# Patient Record
Sex: MALE | Race: WHITE | Employment: FULL TIME | URBAN - METROPOLITAN AREA
[De-identification: names, ages, dates, MRNs, and addresses within clinical notes are randomized per-mention and may not be internally consistent; named-entity substitution may affect disease eponyms.]

---

## 2019-01-21 ENCOUNTER — OFFICE VISIT (OUTPATIENT)
Dept: INTERNAL MEDICINE CLINIC | Age: 54
End: 2019-01-21
Payer: COMMERCIAL

## 2019-01-21 VITALS
DIASTOLIC BLOOD PRESSURE: 76 MMHG | OXYGEN SATURATION: 98 % | HEART RATE: 84 BPM | WEIGHT: 221 LBS | SYSTOLIC BLOOD PRESSURE: 130 MMHG | BODY MASS INDEX: 29.16 KG/M2

## 2019-01-21 DIAGNOSIS — Z01.818 PRE-OP EXAMINATION: ICD-10-CM

## 2019-01-21 DIAGNOSIS — Z23 NEED FOR INFLUENZA VACCINATION: ICD-10-CM

## 2019-01-21 DIAGNOSIS — E78.5 HYPERLIPIDEMIA, UNSPECIFIED HYPERLIPIDEMIA TYPE: ICD-10-CM

## 2019-01-21 DIAGNOSIS — M17.12 LOCALIZED OSTEOARTHRITIS OF LEFT KNEE: Primary | ICD-10-CM

## 2019-01-21 PROCEDURE — 90471 IMMUNIZATION ADMIN: CPT | Performed by: NURSE PRACTITIONER

## 2019-01-21 PROCEDURE — 99214 OFFICE O/P EST MOD 30 MIN: CPT | Performed by: NURSE PRACTITIONER

## 2019-01-21 PROCEDURE — 90686 IIV4 VACC NO PRSV 0.5 ML IM: CPT | Performed by: NURSE PRACTITIONER

## 2020-02-17 ENCOUNTER — OFFICE VISIT (OUTPATIENT)
Dept: INTERNAL MEDICINE CLINIC | Age: 55
End: 2020-02-17
Payer: COMMERCIAL

## 2020-02-17 ENCOUNTER — HOSPITAL ENCOUNTER (OUTPATIENT)
Age: 55
Discharge: HOME | End: 2020-02-17
Payer: COMMERCIAL

## 2020-02-17 VITALS
RESPIRATION RATE: 16 BRPM | HEART RATE: 84 BPM | BODY MASS INDEX: 30.75 KG/M2 | HEIGHT: 72 IN | OXYGEN SATURATION: 97 % | SYSTOLIC BLOOD PRESSURE: 136 MMHG | DIASTOLIC BLOOD PRESSURE: 88 MMHG | WEIGHT: 227 LBS

## 2020-02-17 LAB
CHOLESTEROL, TOTAL: 277 MG/DL (ref 0–199)
HDLC SERPL-MCNC: 48 MG/DL (ref 40–60)
LDL CHOLESTEROL CALCULATED: 198 MG/DL
TRIGL SERPL-MCNC: 153 MG/DL (ref 0–150)
TSH REFLEX: 2.25 UIU/ML (ref 0.27–4.2)
VLDLC SERPL CALC-MCNC: 31 MG/DL

## 2020-02-17 PROCEDURE — 99213 OFFICE O/P EST LOW 20 MIN: CPT | Performed by: NURSE PRACTITIONER

## 2020-02-17 PROCEDURE — 93000 ELECTROCARDIOGRAM COMPLETE: CPT | Performed by: NURSE PRACTITIONER

## 2020-02-17 PROCEDURE — 84443 ASSAY THYROID STIM HORMONE: CPT

## 2020-02-17 PROCEDURE — 80061 LIPID PANEL: CPT

## 2020-02-17 PROCEDURE — 36415 COLL VENOUS BLD VENIPUNCTURE: CPT

## 2020-02-17 ASSESSMENT — PATIENT HEALTH QUESTIONNAIRE - PHQ9
SUM OF ALL RESPONSES TO PHQ QUESTIONS 1-9: 0
1. LITTLE INTEREST OR PLEASURE IN DOING THINGS: 0
SUM OF ALL RESPONSES TO PHQ QUESTIONS 1-9: 0
2. FEELING DOWN, DEPRESSED OR HOPELESS: 0
SUM OF ALL RESPONSES TO PHQ9 QUESTIONS 1 & 2: 0

## 2020-02-17 ASSESSMENT — ENCOUNTER SYMPTOMS
ORTHOPNEA: 0
CHEST TIGHTNESS: 0
COUGH: 0
CONSTIPATION: 0
SORE THROAT: 0
SINUS PAIN: 0
SHORTNESS OF BREATH: 0
WHEEZING: 0
VOMITING: 0
NAUSEA: 0
DIARRHEA: 0
SPUTUM PRODUCTION: 0
ABDOMINAL PAIN: 0

## 2020-02-17 NOTE — PROGRESS NOTES
Robbie [address]  [address] [facility]  [address]  Tel:[phone]    Nieves Duran is a 49 y.o. male who presents today for hismedical conditions/complaints as noted below. Nieves Merritter is c/o of Chest Pain (Lft Side; Started a couple of weeks ago)    Chief Complaint   Patient presents with    Chest Pain     Lft Side; Started a couple of weeks ago     HPI:     Chest Pain    This is a new problem. The current episode started 1 to 4 weeks ago. The onset quality is gradual. The problem occurs daily. The problem has been unchanged. The pain is present in the substernal region. The pain is at a severity of 4/10. The pain is mild. The quality of the pain is described as sharp and squeezing. The pain does not radiate. Pertinent negatives include no abdominal pain, claudication, cough, diaphoresis, dizziness, exertional chest pressure, fever, irregular heartbeat, lower extremity edema, malaise/fatigue, nausea, near-syncope, orthopnea, palpitations, shortness of breath, sputum production, vomiting or weakness. Associated symptoms comments: Able to specifically point to area of pain near the left sternal border. The pain is aggravated by nothing. He has tried nothing for the symptoms. The treatment provided no relief. Risk factors include male gender. His family medical history is significant for hypertension, early MI and stroke. Subjective:     Review of Systems   Constitutional: Negative for diaphoresis, fever and malaise/fatigue. HENT: Negative for sinus pain and sore throat. Respiratory: Negative for cough, sputum production, chest tightness, shortness of breath and wheezing. Cardiovascular: Positive for chest pain. Negative for palpitations, orthopnea, claudication, leg swelling and near-syncope. Gastrointestinal: Negative for abdominal pain, constipation, diarrhea, nausea and vomiting.    Genitourinary: Negative for dysuria and urgency. Skin: Negative for rash. Neurological: Negative for dizziness and weakness. Objective:     Vitals:    02/17/20 1339   BP: 136/88   Site: Right Upper Arm   Position: Sitting   Cuff Size: Medium Adult   Pulse: 84   Resp: 16   SpO2: 97%   Weight: 227 lb (103 kg)   Height: 6' (1.829 m)     Physical Exam  Vitals signs and nursing note reviewed. Constitutional:       Appearance: Normal appearance. He is well-developed. HENT:      Head: Normocephalic and atraumatic. Right Ear: Hearing and external ear normal.      Left Ear: Hearing and external ear normal.      Nose: Nose normal.   Eyes:      General: Lids are normal.      Pupils: Pupils are equal, round, and reactive to light. Neck:      Musculoskeletal: Normal range of motion. Cardiovascular:      Rate and Rhythm: Normal rate and regular rhythm. No extrasystoles are present. Chest Wall: PMI is not displaced. Pulses: Normal pulses. Heart sounds: Normal heart sounds, S1 normal and S2 normal. Heart sounds not distant. No murmur. No systolic murmur. No diastolic murmur. No friction rub. No gallop. No S3 or S4 sounds. Pulmonary:      Effort: Pulmonary effort is normal. No accessory muscle usage or respiratory distress. Breath sounds: Normal breath sounds. No decreased breath sounds, wheezing, rhonchi or rales. Chest:      Chest wall: Tenderness present. No mass, lacerations or edema. There is no dullness to percussion. Breasts:         Right: Normal.         Left: Normal.       Abdominal:      General: Bowel sounds are normal.      Palpations: Abdomen is soft. Lymphadenopathy:      Upper Body:      Right upper body: No supraclavicular or axillary adenopathy. Left upper body: No supraclavicular or axillary adenopathy. Skin:     General: Skin is warm and dry. Neurological:      Mental Status: He is alert. Psychiatric:         Speech: Speech normal.       Assessment & Plan:    The following diagnoses and conditions are stable with no further orders unlessindicated:    1. Chest pain, unspecified type    2. Colon cancer screening      Cristian Marrufo was seen today for chest pain. Diagnoses and all orders for this visit:    Chest pain, unspecified type  -     EKG 12 Lead  -     LIPID PANEL; Future  -     TSH with Reflex; Future    Suspect Musculoskeletal pain as primary cause. Recommend NSAIDs (400-600 mg Ibuprofen TID PRN) for relief of inflammatory pain. Encouraged stretches before extensive physical activity. Monitor for changes in symptomatology including shortness of breath/exertional dyspnea, pain migrating toward the left shoulder/neck, nausea, palpitations. EKG normal in office. Consider CXR if symptoms persist.     Colon cancer screening  -     Valencia Raymond MD, Gastroenterology, [facility]    Return if symptoms worsen or fail to improve. Patient should call the office immediately with new or ongoing signs or symptoms or worsening, or proceed to the emergency room. If you are onmedications which could impair your senses, you are at risk of weakness, falls, dizziness, or drowsiness. You should be careful during activities which could place you at risk of harm, such as climbing, using stairs,operating machinery, or driving vehicles. If you feel you cannot safely do these activities, you should request others to help you, or avoid the activities altogether. If you are drowsy for any other reason, you should usethe same precautions as listed above. Call if pattern of symptoms change or persists for an extended time.     BARBARA Jay

## 2020-02-18 RX ORDER — ATORVASTATIN CALCIUM 20 MG/1
20 TABLET, FILM COATED ORAL DAILY
Qty: 30 TABLET | Refills: 2 | Status: SHIPPED | OUTPATIENT
Start: 2020-02-18 | End: 2020-03-19

## 2020-03-12 ENCOUNTER — OFFICE VISIT (OUTPATIENT)
Dept: INTERNAL MEDICINE CLINIC | Age: 55
End: 2020-03-12
Payer: COMMERCIAL

## 2020-03-12 VITALS
OXYGEN SATURATION: 98 % | HEART RATE: 73 BPM | BODY MASS INDEX: 30.38 KG/M2 | DIASTOLIC BLOOD PRESSURE: 80 MMHG | WEIGHT: 224 LBS | SYSTOLIC BLOOD PRESSURE: 130 MMHG

## 2020-03-12 PROCEDURE — 99214 OFFICE O/P EST MOD 30 MIN: CPT | Performed by: NURSE PRACTITIONER

## 2020-03-12 RX ORDER — CEFDINIR 300 MG/1
300 CAPSULE ORAL 2 TIMES DAILY
Qty: 14 CAPSULE | Refills: 0 | Status: SHIPPED | OUTPATIENT
Start: 2020-03-12 | End: 2020-03-19

## 2020-03-12 ASSESSMENT — ENCOUNTER SYMPTOMS
VOMITING: 0
DIARRHEA: 0
RHINORRHEA: 1
CHEST TIGHTNESS: 0
WHEEZING: 0
NAUSEA: 0
SHORTNESS OF BREATH: 0
SINUS PRESSURE: 1
VOICE CHANGE: 1
SORE THROAT: 0
COUGH: 1
SINUS PAIN: 0
CONSTIPATION: 0

## 2020-05-13 ENCOUNTER — HOSPITAL ENCOUNTER (OUTPATIENT)
Age: 55
Discharge: HOME | End: 2020-05-13
Payer: COMMERCIAL

## 2020-05-13 LAB
A/G RATIO: 1.8 (ref 1.1–2.2)
ALBUMIN SERPL-MCNC: 4.5 G/DL (ref 3.4–5)
ALP BLD-CCNC: 63 U/L (ref 40–129)
ALT SERPL-CCNC: 37 U/L (ref 10–40)
ANION GAP SERPL CALCULATED.3IONS-SCNC: 11 MMOL/L (ref 3–16)
AST SERPL-CCNC: 26 U/L (ref 15–37)
BILIRUB SERPL-MCNC: 0.5 MG/DL (ref 0–1)
BUN BLDV-MCNC: 24 MG/DL (ref 7–20)
CALCIUM SERPL-MCNC: 9.6 MG/DL (ref 8.3–10.6)
CHLORIDE BLD-SCNC: 103 MMOL/L (ref 99–110)
CHOLESTEROL, TOTAL: 136 MG/DL (ref 0–199)
CO2: 27 MMOL/L (ref 21–32)
CREAT SERPL-MCNC: 0.6 MG/DL (ref 0.9–1.3)
GFR AFRICAN AMERICAN: >60
GFR NON-AFRICAN AMERICAN: >60
GLOBULIN: 2.5 G/DL
GLUCOSE BLD-MCNC: 111 MG/DL (ref 70–99)
HDLC SERPL-MCNC: 39 MG/DL (ref 40–60)
HIV AG/AB: NORMAL
HIV ANTIGEN: NORMAL
HIV-1 ANTIBODY: NORMAL
HIV-2 AB: NORMAL
LDL CHOLESTEROL CALCULATED: 84 MG/DL
POTASSIUM SERPL-SCNC: 4.4 MMOL/L (ref 3.5–5.1)
PROSTATE SPECIFIC ANTIGEN: 0.98 NG/ML (ref 0–4)
SODIUM BLD-SCNC: 141 MMOL/L (ref 136–145)
TOTAL PROTEIN: 7 G/DL (ref 6.4–8.2)
TRIGL SERPL-MCNC: 66 MG/DL (ref 0–150)
VLDLC SERPL CALC-MCNC: 13 MG/DL

## 2020-05-13 PROCEDURE — 86701 HIV-1ANTIBODY: CPT

## 2020-05-13 PROCEDURE — 80053 COMPREHEN METABOLIC PANEL: CPT

## 2020-05-13 PROCEDURE — 80061 LIPID PANEL: CPT

## 2020-05-13 PROCEDURE — 36415 COLL VENOUS BLD VENIPUNCTURE: CPT

## 2020-05-13 PROCEDURE — 87390 HIV-1 AG IA: CPT

## 2020-05-13 PROCEDURE — 86702 HIV-2 ANTIBODY: CPT

## 2020-05-13 PROCEDURE — 84153 ASSAY OF PSA TOTAL: CPT

## 2020-05-14 ENCOUNTER — VIRTUAL VISIT (OUTPATIENT)
Dept: INTERNAL MEDICINE CLINIC | Age: 55
End: 2020-05-14
Payer: COMMERCIAL

## 2020-05-14 PROCEDURE — 99212 OFFICE O/P EST SF 10 MIN: CPT | Performed by: NURSE PRACTITIONER

## 2020-05-14 ASSESSMENT — ENCOUNTER SYMPTOMS
CONSTIPATION: 0
VOMITING: 0
SHORTNESS OF BREATH: 0
SINUS PAIN: 0
DIARRHEA: 0
NAUSEA: 0
COUGH: 0
SORE THROAT: 0
CHEST TIGHTNESS: 0

## 2020-05-14 ASSESSMENT — PATIENT HEALTH QUESTIONNAIRE - PHQ9
2. FEELING DOWN, DEPRESSED OR HOPELESS: 0
SUM OF ALL RESPONSES TO PHQ QUESTIONS 1-9: 0
SUM OF ALL RESPONSES TO PHQ9 QUESTIONS 1 & 2: 0
SUM OF ALL RESPONSES TO PHQ QUESTIONS 1-9: 0
1. LITTLE INTEREST OR PLEASURE IN DOING THINGS: 0

## 2020-05-14 NOTE — PROGRESS NOTES
2020    TELEHEALTH EVALUATION -- Audio/Visual (During [id number] public health emergency)    HPI:    Jb Chand (:  1965) has requested an audio/video evaluation for the following concern(s):    Mr. Ashley Kat presents for his HLD check. Overall he states he is doing well. Denies side effects of statin. Hyperlipidemia   This is a chronic problem. The current episode started more than 1 year ago. The problem is controlled. Recent lipid tests were reviewed and are normal. He has no history of chronic renal disease, diabetes, hypothyroidism or obesity. Factors aggravating his hyperlipidemia include fatty foods. Pertinent negatives include no chest pain, focal sensory loss, leg pain, myalgias or shortness of breath. Current antihyperlipidemic treatment includes statins, diet change and exercise. The current treatment provides significant improvement of lipids. There are no compliance problems. Risk factors for coronary artery disease include family history, male sex and dyslipidemia. The 10-year ASCVD risk score (Jamarcus Lang, et al., 2013) is: 3.7%    Values used to calculate the score:      Age: 49 years      Sex: Male      Is Non- : No      Diabetic: No      Tobacco smoker: No      Systolic Blood Pressure: 638 mmHg      Is BP treated: No      HDL Cholesterol: 39 mg/dL      Total Cholesterol: 136 mg/dL        Review of Systems   Constitutional: Negative for fever. HENT: Negative for sinus pain and sore throat. Respiratory: Negative for cough, chest tightness and shortness of breath. Cardiovascular: Negative for chest pain and palpitations. Gastrointestinal: Negative for constipation, diarrhea, nausea and vomiting. Genitourinary: Negative for dysuria and urgency. Musculoskeletal: Negative for myalgias. Skin: Negative for rash. Neurological: Negative for dizziness and weakness. Prior to Visit Medications    Medication Sig Taking?  Authorizing Provider

## 2020-09-04 ENCOUNTER — TELEPHONE (OUTPATIENT)
Dept: GASTROENTEROLOGY | Age: 55
End: 2020-09-04

## 2020-09-04 RX ORDER — POLYETHYLENE GLYCOL 3350 17 G/17G
POWDER, FOR SOLUTION ORAL
Qty: 238 G | Refills: 0 | Status: ON HOLD | OUTPATIENT
Start: 2020-09-04 | End: 2020-12-03 | Stop reason: HOSPADM

## 2020-09-04 RX ORDER — BISACODYL 5 MG
TABLET, DELAYED RELEASE (ENTERIC COATED) ORAL
Qty: 4 TABLET | Refills: 0 | Status: ON HOLD | OUTPATIENT
Start: 2020-09-04 | End: 2020-12-03 | Stop reason: HOSPADM

## 2020-09-04 NOTE — TELEPHONE ENCOUNTER
Trying to schedule colonoscopy    No hist. Diarrhea/ constipation  No blood thinners/ seizure meds    Prefers Monday or   Friday Oct 9th.

## 2020-09-04 NOTE — LETTER
- If you have questions after beginning the prep, call between 8:30 am & 4:30pm you will speak to a nurse or medical assistant, after 4:30pm you will reach the physician on call. - Hydration (body fluid) is the most important aspect of an effective and safe prep. If you are not drinking enough fluid you may experience cramping, nausea and dizziness. - Common side effects of the prep are nausea, bloating and shaking chills. If any of these occur, take a break from the prep (30 minutes to 1 hour) and then restart. If unable to complete after that notify the Physician as your procedure may need to be cancelled. Nausea medication or an alternative prep is sometimes called in.  - Do not leave home after starting the prep. Frequent, liquid stools may begin as soon as 15 minutes after the first bottle, although it could take up to 4 hours or longer for your first bowel movement. - Even if your stools are clear and watery in appearance, TAKE ALL OF THE PREP.  - Using baby wipes and nonprescription ointments, such as Desitin, will help with the irritation caused by frequent loose stools. - Due to unforeseen schedule changes, you may be asked to move your appointment time to an earlier/later time slot. To insure that your prep gives you the best results for your Colonoscopy, Please follow all directions closely. 3-5 days prior to your procedure:  1. Begin a low-fiber diet (no corn, dried beans, tomatoes, nuts, seeds, lettuce). 2. No bran or bulking agents. 3. Stop taking your multivitamin or iron supplements.   4. If you currently take Aggrenox, Dipyridamole, Persantine, Fondaparinux sodium (Arixtra), Dalteparin sodium Billee Maria Luz), Warfarin (Coumadin), Clopidogrel (Plavix), Prasugrel (Effient), Enoxaparin, Lovenox, or Heparin, Cilostazol (Pletal), Rivoroxaban (Xarelto), Desirudin (Iprivask), Cyclopentyltrazolopyrimide (Ticagrelor or Brilinta), Cangrelor (Cisneros Freshwater), Dabigatran (Pradaxa), Apixaban (Eliquis), Edoxaban (Savaysa)you should have received instructions regarding if and when to discontinue the medication. If you have not, or do not clearly understand the instructions, please call the office for clarification (number listed above). 5. Drink plenty of fluid. 1 day prior to your procedure:  1. Do not eat any SOLID FOOD, beginning with breakfast drink clear liquids only, which includes: Chicken or Beef Broth, Coffee/tea (without milk or creamer) Gatorade/PowerAde (no red or purple), JeIl-O (no red or purple), All Soda (even dark cola), Sorbet/Popsicles (no red or purple), Water If you take Diabetic medications (insulin/oral medication)-reduce the amount by one half on the morning of prep. You may resume the meds once you begin eating again. You must drink 8oz of liquids every hour to avoid dehydration. 2. If you take Diabetic medications (insulin/oral medication) - reduce the amount by one half on morning of prep. You may resume the meds once you begin eating again. 3. Bowel Cleansing Prep  ** 3:00pm Take 4 dulcolax (bisacodyl) tablets with a full glass of water  ** 5:00pm Mix one bottle of Miralax (polyethlene glycol) (238/255gm) in one bottle of Gatorade (64oz). Shake until dissolved. Drink 8 oz every 15 minutes until you have finished half of the solution. MORNING OF PROCEDURE  1. __3:00am__ (5 hours prior to the procedure) Drink the remaining portion of the solution 8 oz. every 15 minutes until completely finished, followed by 8 oz of any of the approved liquids. 2. Take your Blood pressure, Heart and Seizure medication the morning of the procedure with sips of water. 3. Bring inhalers with you. 4. Do not take your Diabetic medication the morning of procedure. 5. You must have a  stay with you during the entire procedure.         Dear Patient,      [facility]ighbor will receive a call from the [facility] pre-registration department prior DIAGNOSIS:  Z12.11-Colon cancer screening    Anesthesia: __IV sedation__  Time Needed: 30 minutes  Pt Position:  lateral, right side up         Outpatient _X_                                    _X__PREP:  Miralax                           _____Cardiac Clearance by; ___________           Medications to be stopped 5 days before procedure: _________  Additional / Special Orders:                                                                                                   Insurance: Herbert Pereira 150              ID #     [id number]            Ph #     (secondary ?)                                  _x___E-mailed 9/4/20     _x__ Spoke to Pt / Spouse                                                                                Franklyn Man    1965                                                    Endoscopy Order   IN ACCORDANCE WITH OUR FORMULARY SYSTEM, A GENERIC EQUIVALENT DRUG MAY BE DISPNSED AND ADMINISTERED UNLESS D. A. W. IS WRITTEN WITH THE MEDICATION ORDER.   DATE HOUR PHYSICIAN:  RECORD DATE, HOUR AND SIGN EACH ENTRY   10/9/20 8:00am 1)  Admit for:   [x]Colonoscopy  []EGD     []Anesthesia/MAC        []ERCP     []Upper EUS     []Lower EUS                             2)  Diagnosis: Z12.11     3)  Establish IV access     Solution:  []0.9 Normal Saline   [x]Lactated Ringers    []Other:                            Rate:   [x]KVO      []Other:     4)  Check blood sugar on all diabetic patients       Urine Pregnancy test on all menstruating females     5)  Labs:       []PT/INR         []Platelet       []Other:____________     6)  Procedure Medications:     [x]Fentanyl ______micrograms IV   []Demerol ________mg IV     [x]Versed _______mg IV                          []Other:     7) [x]Dinemap      [x]Pulse Oximetry    [x]Cardiac Monitor     8)  Post Procedure:       [x]Titrate O2 to keep O2 Sat.  > 90%     [x]Discharge instructions per                                                                     Endoscopy Protocol

## 2020-09-04 NOTE — TELEPHONE ENCOUNTER
Scheduled patient 10/9/20 with Dr. Blair Nichols. Mailed out prep instructions as requested. Patient aware to get tested for Covid on 10/3/20.      Please sign med order

## 2020-10-12 ENCOUNTER — TELEPHONE (OUTPATIENT)
Dept: GASTROENTEROLOGY | Age: 55
End: 2020-10-12

## 2020-10-12 NOTE — TELEPHONE ENCOUNTER
Moved appt day from [email or web address]@[employer] to 12/4/20@8am due to issues with getting covid testing done.  Endo faxed

## 2020-11-27 ENCOUNTER — OFFICE VISIT (OUTPATIENT)
Dept: PRIMARY CARE CLINIC | Age: 55
End: 2020-11-27
Payer: COMMERCIAL

## 2020-11-27 PROCEDURE — 99211 OFF/OP EST MAY X REQ PHY/QHP: CPT | Performed by: NURSE PRACTITIONER

## 2020-11-27 NOTE — PROGRESS NOTES
Zoe Caba received a viral test for COVID-19. They were educated on isolation and quarantine as appropriate. For any symptoms, they were directed to seek care from their PCP, given contact information to establish with a doctor, directed to an urgent care or the emergency room.

## 2020-11-27 NOTE — PATIENT INSTRUCTIONS

## 2020-11-28 LAB — SARS-COV-2: NOT DETECTED

## 2020-12-03 ENCOUNTER — HOSPITAL ENCOUNTER (OUTPATIENT)
Age: 55
Setting detail: OUTPATIENT SURGERY
Discharge: HOME | End: 2020-12-03
Attending: INTERNAL MEDICINE | Admitting: INTERNAL MEDICINE
Payer: COMMERCIAL

## 2020-12-03 VITALS
OXYGEN SATURATION: 100 % | RESPIRATION RATE: 14 BRPM | SYSTOLIC BLOOD PRESSURE: 129 MMHG | HEART RATE: 66 BPM | DIASTOLIC BLOOD PRESSURE: 77 MMHG | HEIGHT: 72 IN | WEIGHT: 226 LBS | BODY MASS INDEX: 30.61 KG/M2

## 2020-12-03 PROBLEM — Z12.11 SCREEN FOR COLON CANCER: Status: ACTIVE | Noted: 2020-12-03

## 2020-12-03 PROBLEM — K63.5 CECAL POLYP: Status: ACTIVE | Noted: 2020-12-03

## 2020-12-03 PROCEDURE — 88305 TISSUE EXAM BY PATHOLOGIST: CPT

## 2020-12-03 PROCEDURE — 45380 COLONOSCOPY AND BIOPSY: CPT | Performed by: INTERNAL MEDICINE

## 2020-12-03 PROCEDURE — 99152 MOD SED SAME PHYS/QHP 5/>YRS: CPT | Performed by: INTERNAL MEDICINE

## 2020-12-03 PROCEDURE — 7100000011 HC PHASE II RECOVERY - ADDTL 15 MIN: Performed by: INTERNAL MEDICINE

## 2020-12-03 PROCEDURE — 2709999900 HC NON-CHARGEABLE SUPPLY: Performed by: INTERNAL MEDICINE

## 2020-12-03 PROCEDURE — 6360000002 HC RX W HCPCS: Performed by: INTERNAL MEDICINE

## 2020-12-03 PROCEDURE — 2580000003 HC RX 258: Performed by: INTERNAL MEDICINE

## 2020-12-03 PROCEDURE — 3609010600 HC COLONOSCOPY POLYPECTOMY SNARE/COLD BIOPSY: Performed by: INTERNAL MEDICINE

## 2020-12-03 PROCEDURE — 99153 MOD SED SAME PHYS/QHP EA: CPT | Performed by: INTERNAL MEDICINE

## 2020-12-03 PROCEDURE — 7100000010 HC PHASE II RECOVERY - FIRST 15 MIN: Performed by: INTERNAL MEDICINE

## 2020-12-03 RX ORDER — MIDAZOLAM HYDROCHLORIDE 5 MG/ML
INJECTION INTRAMUSCULAR; INTRAVENOUS PRN
Status: DISCONTINUED | OUTPATIENT
Start: 2020-12-03 | End: 2020-12-03 | Stop reason: ALTCHOICE

## 2020-12-03 RX ORDER — DIPHENHYDRAMINE HYDROCHLORIDE 50 MG/ML
INJECTION INTRAMUSCULAR; INTRAVENOUS PRN
Status: DISCONTINUED | OUTPATIENT
Start: 2020-12-03 | End: 2020-12-03 | Stop reason: ALTCHOICE

## 2020-12-03 RX ORDER — SODIUM CHLORIDE, SODIUM LACTATE, POTASSIUM CHLORIDE, CALCIUM CHLORIDE 600; 310; 30; 20 MG/100ML; MG/100ML; MG/100ML; MG/100ML
INJECTION, SOLUTION INTRAVENOUS CONTINUOUS PRN
Status: COMPLETED | OUTPATIENT
Start: 2020-12-03 | End: 2020-12-03

## 2020-12-03 RX ORDER — FENTANYL CITRATE 50 UG/ML
INJECTION, SOLUTION INTRAMUSCULAR; INTRAVENOUS PRN
Status: DISCONTINUED | OUTPATIENT
Start: 2020-12-03 | End: 2020-12-03 | Stop reason: ALTCHOICE

## 2020-12-03 RX ORDER — SODIUM CHLORIDE, SODIUM LACTATE, POTASSIUM CHLORIDE, CALCIUM CHLORIDE 600; 310; 30; 20 MG/100ML; MG/100ML; MG/100ML; MG/100ML
INJECTION, SOLUTION INTRAVENOUS CONTINUOUS
Status: DISCONTINUED | OUTPATIENT
Start: 2020-12-03 | End: 2020-12-03 | Stop reason: HOSPADM

## 2020-12-03 RX ADMIN — SODIUM CHLORIDE, POTASSIUM CHLORIDE, SODIUM LACTATE AND CALCIUM CHLORIDE: 600; 310; 30; 20 INJECTION, SOLUTION INTRAVENOUS at 07:29

## 2020-12-03 ASSESSMENT — PAIN SCALES - GENERAL
PAINLEVEL_OUTOF10: 0

## 2020-12-03 ASSESSMENT — PAIN - FUNCTIONAL ASSESSMENT: PAIN_FUNCTIONAL_ASSESSMENT: 0-10

## 2020-12-03 NOTE — OP NOTE
[address] ,  [address] [facility]  Phone: [phone]   FQI:[phone]    Colonoscopy Procedure Note    Patient: Km Steven  : 1965    Procedure: Colonoscopy with --screening    Date:  12/3/2020     Endoscopist:  Juliocesar Lewis MD    Referring Physician:  Zabrina Ornelas MD    Preoperative Diagnosis:  colon screening    Postoperative Diagnosis:  See impression    Anesthesia: Anesthesia: Moderate Sedation  Sedation: Versed 7 mg IV, fentanyl 100 mcg IV, IV Benadryl 50 mg  Start Time: 7:40  Stop Time: 8:03  Withdrawal time: 8 minutes  ASA Class: 2  Mallampati: II (soft palate, uvula, fauces visible)    Indications: This is a 48y.o. year old male who presents today with screening for colon cancer. Procedure Details  Informed consent was obtained for the procedure, including sedation. Risks of perforation, hemorrhage, adverse drug reaction and aspiration were discussed. The patient was placed in the left lateral decubitus position. Based on the pre-procedure assessment, including review of the patient's medical history, medications, allergies, and review of systems, he had been deemed to be an appropriate candidate for conscious sedation; he was therefore sedated with the medications listed below. The patient was monitored continuously with ECG tracing, pulse oximetry, blood pressure monitoring, and direct observations. rectal examination was performed. The colonoscope was inserted into the rectum and advanced under direct vision to the cecum, which was identified by the ileocecal valve and appendiceal orifice. The quality of the colonic preparation was good. A careful inspection was made as the colonoscope was withdrawn, including a retroflexed view of the rectum; findings and interventions are described below. Appropriate photodocumentation was obtained. Patient tolerated the procedure well. Findings: -Mild sigmoid diverticulosis. One 4 mm sessile cecal polyp removed with cold forceps. Otherwise normal colonoscopy to the cecum.    - Anesthesia issues: no    Specimens: Was Obtained:  bottle A, sessile polyp, cecum, cold forceps    Complications:   None    Estimated blood loss: minimal    Disposition:   PACU - hemodynamically stable. Impression:   -Mild sigmoid diverticulosis. One 4 mm sessile cecal polyp removed with cold forceps. Otherwise normal colonoscopy to the cecum. Recommendations:  -Await pathology.  Likely repeat colonoscopy based on pathology.  -High fiber diet recommended for diverticulosis          Bronson Trivedi 12/3/20 8:07 AM EST

## 2020-12-03 NOTE — H&P
[address] ,  [address] [facility]  Phone: [phone]    CHIEF COMPLAINT     Here for screening colonoscopy. KRYSTA Foster is a 48 y.o. male who presents for screening colonoscopy. Denies GI symptoms. Denies family history of colon cancer in first degree family members. PAST MEDICAL HISTORY     Past Medical History:   Diagnosis Date    Acne     DR. Diana Kang    Basal cell adenocarcinoma     Skin    Family history of early CAD     Hyperlipidemia     [facility]    Treatment surgery and chemotherapy Dr. Chetan Henriquez [facility] 1975    DX at 6years old    Varicose veins      FAMILY HISTORY     Family History   Problem Relation Age of Onset    Cancer Mother         Breast, Thyroid, Skin, Uterine CA    Heart Disease Father 67        MI    Cancer Father         Skin    High Blood Pressure Father     Other Brother         Kidney Stones     SOCIAL HISTORY     Social History     Socioeconomic History    Marital status:      Spouse name: Not on file    Number of children: Not on file    Years of education: Not on file    Highest education level: Not on file   Occupational History    Not on file   Social Needs    Financial resource strain: Not on file    Food insecurity     Worry: Not on file     Inability: Not on file    Transportation needs     Medical: Not on file     Non-medical: Not on file   Tobacco Use    Smoking status: Never Smoker    Smokeless tobacco: Never Used   Substance and Sexual Activity    Alcohol use: Yes     Comment: Rare    Drug use: No    Sexual activity: Yes     Partners: Female   Lifestyle    Physical activity     Days per week: Not on file     Minutes per session: Not on file    Stress: Not on file   Relationships    Social connections     Talks on phone: Not on file     Gets together: Not on file     Attends [employer] service: Not on file     Active member of club or organization: Not on file     Attends meetings of clubs or organizations: Not on file     Relationship status: Not on file    Intimate partner violence     Fear of current or ex partner: Not on file     Emotionally abused: Not on file     Physically abused: Not on file     Forced sexual activity: Not on file   Other Topics Concern    Not on file   Social History Narrative    Not on file     SURGICAL HISTORY     Past Surgical History:   Procedure Laterality Date    APPENDECTOMY      [facility]  2009    Polymyositis    SKIN CANCER EXCISION  2012    Basal cell-left temporal area    SKIN CANCER EXCISION  2009    Nodular malignant melanoma: Surgery, chemotherapy Dr. Calos Gunn     No current facility-administered medications on file prior to encounter. Current Outpatient Medications on File Prior to Encounter   Medication Sig Dispense Refill    polyethylene glycol (MIRALAX) 17 GM/SCOOP powder Use as directed for colonoscopy prep 238 g 0    bisacodyl (DULCOLAX) 5 MG EC tablet Use as directed for colonoscopy prep 4 tablet 0    atorvastatin (LIPITOR) 20 MG tablet TAKE 1 TABLET BY MOUTH EVERY DAY 90 tablet 2     ALLERGIES   No Known Allergies  IMMUNIZATIONS     Immunization History   Administered Date(s) Administered    Influenza, Quadv, IM, PF (6 mo and older Fluzone, Flulaval, Fluarix, and 3 yrs and older Afluria) 01/21/2019    Td, unspecified formulation 11/14/2007     REVIEW OF SYSTEMS     Constitutional: Negative for appetite change, chills, fatigue, fever and unexpected weight change. HENT: Negative for ear pain, hearing loss and nosebleeds. Eyes: Negative for pain and visual disturbance. Respiratory: Negative for cough, shortness of breath and wheezing. Cardiovascular: Negative for chest pain, palpitations and leg swelling. Gastrointestinal: see HPI for details.   Endocrine: Negative for polydipsia, polyphagia and polyuria. Genitourinary: Negative for difficulty urinating, dysuria, hematuria and urgency. Musculoskeletal: Positive for arthralgias and back pain. Skin: Negative for pallor and rash. Allergic/Immunologic: Negative for environmental allergies and immunocompromised state. Neurological: Negative for seizures, syncope and numbness. Hematological: Negative for adenopathy. Does not bruise/bleed easily. Psychiatric/Behavioral: Negative for agitation, confusion, hallucinations and suicidal ideas. PHYSICAL EXAM   BP (!) 147/86   Pulse 81   Resp 14   Ht 6' (1.829 m)   Wt 226 lb (102.5 kg)   SpO2 99%   BMI 30.65 kg/m²   Wt Readings from Last 3 Encounters:   12/03/20 226 lb (102.5 kg)   03/12/20 224 lb (101.6 kg)   02/17/20 227 lb (103 kg)     Constitutional: AAO3, No acute distress  HEENT: no pallor or icterus. Cardiovascular: Normal heart rate, Normal rhythm, No murmurs,. RS: Normal breath sounds, No wheezing,   Abdomen: soft, non tender, not distended, Bs+. Extremities:  No edema.       FINAL IMPRESSION   Proceed with colonoscopy for screening   Sedation plan: moderate

## 2021-01-02 PROBLEM — Z12.11 SCREEN FOR COLON CANCER: Status: RESOLVED | Noted: 2020-12-03 | Resolved: 2021-01-02

## 2021-02-15 ENCOUNTER — OFFICE VISIT (OUTPATIENT)
Dept: INTERNAL MEDICINE CLINIC | Age: 56
End: 2021-02-15
Payer: COMMERCIAL

## 2021-02-15 ENCOUNTER — HOSPITAL ENCOUNTER (OUTPATIENT)
Age: 56
Discharge: HOME | End: 2021-02-15
Payer: COMMERCIAL

## 2021-02-15 VITALS
BODY MASS INDEX: 31.37 KG/M2 | HEART RATE: 78 BPM | HEIGHT: 72 IN | TEMPERATURE: 97.1 F | DIASTOLIC BLOOD PRESSURE: 92 MMHG | WEIGHT: 231.6 LBS | SYSTOLIC BLOOD PRESSURE: 136 MMHG | OXYGEN SATURATION: 98 %

## 2021-02-15 DIAGNOSIS — M33.20 POLYMYOSITIS (HCC): ICD-10-CM

## 2021-02-15 DIAGNOSIS — C43.59 MALIGNANT MELANOMA OF TORSO EXCLUDING BREAST (HCC): ICD-10-CM

## 2021-02-15 DIAGNOSIS — G89.29 CHRONIC LEFT SHOULDER PAIN: Primary | ICD-10-CM

## 2021-02-15 DIAGNOSIS — E78.41 ELEVATED LIPOPROTEIN(A): ICD-10-CM

## 2021-02-15 DIAGNOSIS — M25.512 CHRONIC LEFT SHOULDER PAIN: Primary | ICD-10-CM

## 2021-02-15 DIAGNOSIS — Z12.5 SPECIAL SCREENING FOR MALIGNANT NEOPLASM OF PROSTATE: ICD-10-CM

## 2021-02-15 PROBLEM — Z80.8 FAMILY HISTORY OF THYROID CANCER: Status: ACTIVE | Noted: 2020-02-13

## 2021-02-15 PROBLEM — M17.12 PRIMARY OSTEOARTHRITIS OF LEFT KNEE: Status: ACTIVE | Noted: 2019-02-04

## 2021-02-15 PROBLEM — Z80.8 FAMILY HISTORY OF BASAL CELL CARCINOMA: Status: ACTIVE | Noted: 2020-02-13

## 2021-02-15 LAB
A/G RATIO: 1.5 (ref 1.1–2.2)
ALBUMIN SERPL-MCNC: 4.4 G/DL (ref 3.4–5)
ALP BLD-CCNC: 67 U/L (ref 40–129)
ALT SERPL-CCNC: 33 U/L (ref 10–40)
ANION GAP SERPL CALCULATED.3IONS-SCNC: 12 MMOL/L (ref 3–16)
AST SERPL-CCNC: 25 U/L (ref 15–37)
BILIRUB SERPL-MCNC: 0.5 MG/DL (ref 0–1)
BUN BLDV-MCNC: 14 MG/DL (ref 7–20)
CALCIUM SERPL-MCNC: 9.5 MG/DL (ref 8.3–10.6)
CHLORIDE BLD-SCNC: 103 MMOL/L (ref 99–110)
CHOLESTEROL, TOTAL: 163 MG/DL (ref 0–199)
CO2: 26 MMOL/L (ref 21–32)
CREAT SERPL-MCNC: 0.6 MG/DL (ref 0.9–1.3)
GFR AFRICAN AMERICAN: >60
GFR NON-AFRICAN AMERICAN: >60
GLOBULIN: 2.9 G/DL
GLUCOSE BLD-MCNC: 100 MG/DL (ref 70–99)
HDLC SERPL-MCNC: 45 MG/DL (ref 40–60)
LDL CHOLESTEROL CALCULATED: 96 MG/DL
POTASSIUM SERPL-SCNC: 4.2 MMOL/L (ref 3.5–5.1)
SODIUM BLD-SCNC: 141 MMOL/L (ref 136–145)
TOTAL PROTEIN: 7.3 G/DL (ref 6.4–8.2)
TRIGL SERPL-MCNC: 109 MG/DL (ref 0–150)
VLDLC SERPL CALC-MCNC: 22 MG/DL

## 2021-02-15 PROCEDURE — 99214 OFFICE O/P EST MOD 30 MIN: CPT | Performed by: INTERNAL MEDICINE

## 2021-02-15 PROCEDURE — 80053 COMPREHEN METABOLIC PANEL: CPT

## 2021-02-15 PROCEDURE — 36415 COLL VENOUS BLD VENIPUNCTURE: CPT

## 2021-02-15 PROCEDURE — 80061 LIPID PANEL: CPT

## 2021-02-15 RX ORDER — FLUOROURACIL 50 MG/G
CREAM TOPICAL
COMMUNITY
Start: 2020-11-24 | End: 2021-02-15

## 2021-02-15 RX ORDER — ATORVASTATIN CALCIUM 20 MG/1
TABLET, FILM COATED ORAL
Qty: 90 TABLET | Refills: 1 | Status: SHIPPED | OUTPATIENT
Start: 2021-02-15 | End: 2021-11-08

## 2021-02-15 ASSESSMENT — PATIENT HEALTH QUESTIONNAIRE - PHQ9
SUM OF ALL RESPONSES TO PHQ QUESTIONS 1-9: 0
1. LITTLE INTEREST OR PLEASURE IN DOING THINGS: 0
2. FEELING DOWN, DEPRESSED OR HOPELESS: 0

## 2021-02-16 ENCOUNTER — TELEPHONE (OUTPATIENT)
Dept: INTERNAL MEDICINE CLINIC | Age: 56
End: 2021-02-16

## 2021-02-16 NOTE — PROGRESS NOTES
Edson Stark (:  1965) is a 48 y.o. male,Established patient, here for evaluation of the following chief complaint(s):  Hyperlipidemia      ASSESSMENT/PLAN:  1. Chronic left shoulder pain  -Referral to orthopedics for evaluation and treatment:     Damion Anand MD, Orthopedic Surgery, [facility]    2. Elevated lipoprotein(a)  -Obtain fasting laboratory studies now as well as before next office visit     lipid Panel; Future  -     Comprehensive Metabolic Panel; Future  -     Lipid Panel; Future  -     Comprehensive Metabolic Panel; Future  -Refill:   Atorvastatin (LIPITOR) 20 MG tablet; TAKE 1 TABLET BY MOUTH EVERY DAY. For high cholesterol, Disp-90 tablet, R-1Normal    3. Malignant melanoma of torso excluding breast ([facility] [address]        Has been treated as noted above. 4. Polymyositis ([facility] [address]              Asymptomatic at this time. 5. Special screening for malignant neoplasm of prostate  -    Health maintenance. PSA screening; Future      Return in about 6 months (around 8/15/2021) for lPIDS. SUBJECTIVE/OBJECTIVE:  HPI  Patient with hyperlipidemia, polymyositis, BCC, positive family history for coronary artery disease. Review last office visit with me: 2016. BPV. Referred to ENT. Review laboratory data 2020: Chemistry panel: BUN: 24.  Creatinine: 0.6. Glucose 111. Lipid panel: Total cholesterol: 136. LDL cholesterol: 84. HIV testing was negative. PSA: 0.98.  12/3/2020. Colonoscopy: Cecal polyp: Pathology: Tubular adenoma. Dr. Osbaldo James. Mild sigmoid diverticulosis. Redo 5 years. Dermatology Dr. Sarah Christina. Positive malignant melanoma. Treated by Dr. Jeyson Francois of oncology with interferon in  until about . Seen by ENT Dr. Blanche Lane 1/15/2018 basal cell excision. Left TKR 2019 Dr. Wenceslao Murphy. Patient complains of pain for least a month left upper chest with radiation into his left shoulder blade. He has had left shoulder problem for a few years. Patient is a  always carried his load on his left shoulder. He relates unable to abduct his left shoulder very far. His left upper chest discomfort is not exertional.  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 He denies shortness of breath. His discomfort is positional in nature. Review of Systems    Review of Systems   Constitutional: negative   HENT: negative   EYES: negative   Respiratory: negative   Gastrointestinal: negative   Endocrine: negative   Musculoskeletal: Left upper chest, left shoulder and left scapular discomfort as noted above. Skin: History of melanoma and basal cell  Allergic/Immunological: negative   Hematological: negative   Psychiatric/Behavorial: negative   CV: negative   CNS: negative   :Negative   S/E:Negative  Renal: Negative      Physical Exam    Head/neck: Ears: Normal TM. No obstruction. Throat: No exudates, no erythema, no tonsillar enlargement. Neck: No lymphadenopathy. Eyes: EOMI, PERRLA with no nystagmus  Lungs: Clear to auscultation. CV: S1-S2 normal.    murmur. Carotid: No bruit. Abdominal Examination: Bowel sounds present. Soft nontender. No mass no guarding or   rebound. Spine/extremities: Decreased range of motion of left shoulder with abduction at 45 degrees only. Spine/extremities: No edema. No tenderness to palpation. Skin: No rash  CNS: Patient is alert, cooperative, moves all 4 limbs, ambulates without difficulty, light touch normal.   Good orientation. Blood pressure (!) 136/92, pulse 78, temperature 97.1 °F (36.2 °C), temperature source Oral, height 6' (1.829 m), weight 231 lb 9.6 oz (105.1 kg), SpO2 98 %. An electronic signature was used to authenticate this note.     --Yajaira Elias MD

## 2021-02-17 NOTE — TELEPHONE ENCOUNTER
Please call patient. Labs from 2/15/2021: Chemistry panel: All normal except glucose at 100 just borderline elevated. Treat: Avoid simple sugars in diet such as soda, cookies, etc.  Lipid panel: Total cholesterol: 163. Great      LDL cholesterol: 96: Good.   Dr. Herve Garzon

## 2021-08-15 NOTE — PROGRESS NOTES
Kristi Herr 48 y.o. male presents today for an Established patient for    Chief Complaint   Patient presents with    Hyperlipidemia           ASSESSMENT/PLAN    1. Elevated lipoprotein(a)               Blood pressure looks good. We have to monitor at home. Obtain laboratory studies now and call me for results as well as before next office visit  - Lipid Panel; Future  - Comprehensive Metabolic Panel; Future    2. Polymyositis (HCC)                    Baseline. 3. Malignant melanoma of torso excluding breast ([address]                   Basal cell removed by ENT from scalp. 4. Chronic left shoulder pain                 Referral to orthopedics       Return in about 6 months (around 2/16/2022) for LIPIDS. HPI:     Next office visit with me: 2/15/2021. Complaint of chronic left shoulder pain. Referred to orthopedics Dr. Ligia Rodríguez but I see no consult note. Review laboratory data 2/15/2021: Chemistry panel: 100. Lipid panel: Total cholesterol: 163. LDL cholesterol: 96. Colonoscopy 12/3/2020: Polyp: Tubular adenoma. Dr. Heena Rowe. Health maintenance: Hepatitis C, Covid vaccine, Tdap, shingles. 2/18/2021 Dr. Rocio Bach ENT basal cell carcinoma removed from the scalp  Patient failed to obtain laboratory studies before this office visit  While on diet weight reduction, 18 pounds, since last office visit.       Results for orders placed or performed during the hospital encounter of 02/15/21   Lipid Panel   Result Value Ref Range    Cholesterol, Total 163 0 - 199 mg/dL    Triglycerides 109 0 - 150 mg/dL    HDL 45 40 - 60 mg/dL    LDL Calculated 96 <100 mg/dL    VLDL Cholesterol Calculated 22 Not Established mg/dL   Comprehensive Metabolic Panel   Result Value Ref Range    Sodium 141 136 - 145 mmol/L    Potassium 4.2 3.5 - 5.1 mmol/L    Chloride 103 99 - 110 mmol/L    CO2 26 21 - 32 mmol/L    Anion Gap 12 3 - 16    Glucose 100 (H) 70 - 99 mg/dL    BUN 14 7 - 20 mg/dL    CREATININE 0.6 (L) 0.9 - 1.3 mg/dL    GFR Non- >60 >60    GFR African American >60 >60    Calcium 9.5 8.3 - 10.6 mg/dL    Total Protein 7.3 6.4 - 8.2 g/dL    Albumin 4.4 3.4 - 5.0 g/dL    Albumin/Globulin Ratio 1.5 1.1 - 2.2    Total Bilirubin 0.5 0.0 - 1.0 mg/dL    Alkaline Phosphatase 67 40 - 129 U/L    ALT 33 10 - 40 U/L    AST 25 15 - 37 U/L    Globulin 2.9 g/dL              ROS:  Review of Systems   Constitutional: negative   HENT: negative   EYES: negative   Respiratory: negative   Gastrointestinal: negative   Endocrine: 18 pound weight reduction on diet  Musculoskeletal: Left shoulder pain referred to orthopedics  Skin: Basal cell removed from scalp by ENT  Allergic/Immunological: negative   Hematological: negative   Psychiatric/Behavorial: negative   CV: negative   CNS: negative   :Negative   S/E:Negative  Renal: Negative     Physical Exam: BP: 132/76 by me  Head/neck: Ears: Normal TM. No obstruction. Throat: Mask. Not examined. Thyroid not palpable. Neck: No lymphadenopathy. Eyes: EOMI, PERRLA with no nystagmus  Lungs: Clear to auscultation. CV: S1-S2 normal.   ALBERTO murmur. Carotid: No bruit. Abdominal Examination: Bowel sounds present. Soft nontender. No mass no guarding or   rebound. Spine/extremities: No edema. No tenderness to palpation. Skin: No rash  CNS: Patient is alert, cooperative, moves all 4 limbs, ambulates without difficulty, light touch normal.  Good historian. Good orientation. Blood pressure 132/76, pulse 67, temperature 96.8 °F (36 °C), temperature source Infrared, height 6' (1.829 m), weight 213 lb 9.6 oz (96.9 kg), SpO2 98 %.          Dyan Acuna MD

## 2021-08-16 ENCOUNTER — OFFICE VISIT (OUTPATIENT)
Dept: INTERNAL MEDICINE CLINIC | Age: 56
End: 2021-08-16
Payer: COMMERCIAL

## 2021-08-16 ENCOUNTER — HOSPITAL ENCOUNTER (OUTPATIENT)
Age: 56
Discharge: HOME | End: 2021-08-16
Payer: COMMERCIAL

## 2021-08-16 VITALS
WEIGHT: 213.6 LBS | BODY MASS INDEX: 28.93 KG/M2 | TEMPERATURE: 96.8 F | OXYGEN SATURATION: 98 % | HEART RATE: 67 BPM | DIASTOLIC BLOOD PRESSURE: 76 MMHG | SYSTOLIC BLOOD PRESSURE: 132 MMHG | HEIGHT: 72 IN

## 2021-08-16 DIAGNOSIS — E78.41 ELEVATED LIPOPROTEIN(A): Primary | ICD-10-CM

## 2021-08-16 DIAGNOSIS — C43.59 MALIGNANT MELANOMA OF TORSO EXCLUDING BREAST (HCC): ICD-10-CM

## 2021-08-16 DIAGNOSIS — M25.512 CHRONIC LEFT SHOULDER PAIN: ICD-10-CM

## 2021-08-16 DIAGNOSIS — E78.41 ELEVATED LIPOPROTEIN(A): ICD-10-CM

## 2021-08-16 DIAGNOSIS — G89.29 CHRONIC LEFT SHOULDER PAIN: ICD-10-CM

## 2021-08-16 DIAGNOSIS — M33.20 POLYMYOSITIS (HCC): ICD-10-CM

## 2021-08-16 LAB
A/G RATIO: 1.6 (ref 1.1–2.2)
ALBUMIN SERPL-MCNC: 4.7 G/DL (ref 3.4–5)
ALP BLD-CCNC: 68 U/L (ref 40–129)
ALT SERPL-CCNC: 33 U/L (ref 10–40)
ANION GAP SERPL CALCULATED.3IONS-SCNC: 12 MMOL/L (ref 3–16)
AST SERPL-CCNC: 31 U/L (ref 15–37)
BILIRUB SERPL-MCNC: 0.7 MG/DL (ref 0–1)
BUN BLDV-MCNC: 17 MG/DL (ref 7–20)
CALCIUM SERPL-MCNC: 9.5 MG/DL (ref 8.3–10.6)
CHLORIDE BLD-SCNC: 100 MMOL/L (ref 99–110)
CHOLESTEROL, TOTAL: 158 MG/DL (ref 0–199)
CO2: 28 MMOL/L (ref 21–32)
CREAT SERPL-MCNC: 0.6 MG/DL (ref 0.9–1.3)
GFR AFRICAN AMERICAN: >60
GFR NON-AFRICAN AMERICAN: >60
GLOBULIN: 2.9 G/DL
GLUCOSE BLD-MCNC: 99 MG/DL (ref 70–99)
HDLC SERPL-MCNC: 47 MG/DL (ref 40–60)
LDL CHOLESTEROL CALCULATED: 96 MG/DL
POTASSIUM SERPL-SCNC: 4.2 MMOL/L (ref 3.5–5.1)
PROSTATE SPECIFIC ANTIGEN: 1.01 NG/ML (ref 0–4)
SODIUM BLD-SCNC: 140 MMOL/L (ref 136–145)
TOTAL PROTEIN: 7.6 G/DL (ref 6.4–8.2)
TRIGL SERPL-MCNC: 76 MG/DL (ref 0–150)
VLDLC SERPL CALC-MCNC: 15 MG/DL

## 2021-08-16 PROCEDURE — 84153 ASSAY OF PSA TOTAL: CPT

## 2021-08-16 PROCEDURE — 36415 COLL VENOUS BLD VENIPUNCTURE: CPT

## 2021-08-16 PROCEDURE — 99214 OFFICE O/P EST MOD 30 MIN: CPT | Performed by: INTERNAL MEDICINE

## 2021-08-16 PROCEDURE — 80061 LIPID PANEL: CPT

## 2021-08-16 PROCEDURE — 80053 COMPREHEN METABOLIC PANEL: CPT

## 2021-08-16 SDOH — ECONOMIC STABILITY: FOOD INSECURITY: WITHIN THE PAST 12 MONTHS, YOU WORRIED THAT YOUR FOOD WOULD RUN OUT BEFORE YOU GOT MONEY TO BUY MORE.: NEVER TRUE

## 2021-08-16 SDOH — ECONOMIC STABILITY: FOOD INSECURITY: WITHIN THE PAST 12 MONTHS, THE FOOD YOU BOUGHT JUST DIDN'T LAST AND YOU DIDN'T HAVE MONEY TO GET MORE.: NEVER TRUE

## 2021-08-16 ASSESSMENT — SOCIAL DETERMINANTS OF HEALTH (SDOH): HOW HARD IS IT FOR YOU TO PAY FOR THE VERY BASICS LIKE FOOD, HOUSING, MEDICAL CARE, AND HEATING?: NOT HARD AT ALL

## 2021-11-08 DIAGNOSIS — E78.41 ELEVATED LIPOPROTEIN(A): ICD-10-CM

## 2021-11-08 RX ORDER — ATORVASTATIN CALCIUM 20 MG/1
TABLET, FILM COATED ORAL
Qty: 90 TABLET | Refills: 1 | Status: SHIPPED | OUTPATIENT
Start: 2021-11-08 | End: 2022-06-27

## 2022-03-03 ENCOUNTER — HOSPITAL ENCOUNTER (OUTPATIENT)
Age: 57
Discharge: HOME | End: 2022-03-03
Payer: COMMERCIAL

## 2022-03-03 ENCOUNTER — OFFICE VISIT (OUTPATIENT)
Dept: INTERNAL MEDICINE CLINIC | Age: 57
End: 2022-03-03
Payer: COMMERCIAL

## 2022-03-03 VITALS
TEMPERATURE: 97.3 F | DIASTOLIC BLOOD PRESSURE: 84 MMHG | SYSTOLIC BLOOD PRESSURE: 134 MMHG | BODY MASS INDEX: 29.84 KG/M2 | OXYGEN SATURATION: 100 % | HEART RATE: 63 BPM | WEIGHT: 220 LBS

## 2022-03-03 DIAGNOSIS — E78.5 HYPERLIPIDEMIA, UNSPECIFIED HYPERLIPIDEMIA TYPE: Primary | ICD-10-CM

## 2022-03-03 DIAGNOSIS — Z23 IMMUNIZATION DUE: ICD-10-CM

## 2022-03-03 LAB
A/G RATIO: 2.3 (ref 1.1–2.2)
ALBUMIN SERPL-MCNC: 4.9 G/DL (ref 3.4–5)
ALP BLD-CCNC: 62 U/L (ref 40–129)
ALT SERPL-CCNC: 36 U/L (ref 10–40)
ANION GAP SERPL CALCULATED.3IONS-SCNC: 16 MMOL/L (ref 3–16)
AST SERPL-CCNC: 27 U/L (ref 15–37)
BILIRUB SERPL-MCNC: 0.7 MG/DL (ref 0–1)
BUN BLDV-MCNC: 20 MG/DL (ref 7–20)
CALCIUM SERPL-MCNC: 9.5 MG/DL (ref 8.3–10.6)
CHLORIDE BLD-SCNC: 102 MMOL/L (ref 99–110)
CHOLESTEROL, TOTAL: 144 MG/DL (ref 0–199)
CO2: 25 MMOL/L (ref 21–32)
CREAT SERPL-MCNC: 0.6 MG/DL (ref 0.9–1.3)
GFR AFRICAN AMERICAN: >60
GFR NON-AFRICAN AMERICAN: >60
GLUCOSE BLD-MCNC: 92 MG/DL (ref 70–99)
HDLC SERPL-MCNC: 48 MG/DL (ref 40–60)
LDL CHOLESTEROL CALCULATED: 84 MG/DL
POTASSIUM SERPL-SCNC: 4.5 MMOL/L (ref 3.5–5.1)
SODIUM BLD-SCNC: 143 MMOL/L (ref 136–145)
TOTAL PROTEIN: 7 G/DL (ref 6.4–8.2)
TRIGL SERPL-MCNC: 59 MG/DL (ref 0–150)
VLDLC SERPL CALC-MCNC: 12 MG/DL

## 2022-03-03 PROCEDURE — 36415 COLL VENOUS BLD VENIPUNCTURE: CPT

## 2022-03-03 PROCEDURE — 90471 IMMUNIZATION ADMIN: CPT | Performed by: NURSE PRACTITIONER

## 2022-03-03 PROCEDURE — 99213 OFFICE O/P EST LOW 20 MIN: CPT | Performed by: NURSE PRACTITIONER

## 2022-03-03 PROCEDURE — 80053 COMPREHEN METABOLIC PANEL: CPT

## 2022-03-03 PROCEDURE — 80061 LIPID PANEL: CPT

## 2022-03-03 PROCEDURE — 90715 TDAP VACCINE 7 YRS/> IM: CPT | Performed by: NURSE PRACTITIONER

## 2022-03-03 ASSESSMENT — ENCOUNTER SYMPTOMS
VOMITING: 0
CONSTIPATION: 0
DIARRHEA: 0
SHORTNESS OF BREATH: 0
BLOOD IN STOOL: 0
ABDOMINAL PAIN: 0
EYE DISCHARGE: 0
COUGH: 0
NAUSEA: 0
WHEEZING: 0

## 2022-03-03 ASSESSMENT — PATIENT HEALTH QUESTIONNAIRE - PHQ9
SUM OF ALL RESPONSES TO PHQ QUESTIONS 1-9: 0
SUM OF ALL RESPONSES TO PHQ9 QUESTIONS 1 & 2: 0
2. FEELING DOWN, DEPRESSED OR HOPELESS: 0
SUM OF ALL RESPONSES TO PHQ QUESTIONS 1-9: 0
1. LITTLE INTEREST OR PLEASURE IN DOING THINGS: 0
SUM OF ALL RESPONSES TO PHQ QUESTIONS 1-9: 0
SUM OF ALL RESPONSES TO PHQ QUESTIONS 1-9: 0

## 2022-03-03 NOTE — PROGRESS NOTES
03/03/22    Gerald Carrera  55 y.o.  male      Chief Complaint   Patient presents with    Hyperlipidemia         HPI:   Pt presents for evaluation of HLD, to review labs, and continue refills of meds. He is on my schedule being unable to get an appointment with Dr. Severo Goodness. Unfortunately he just stopped down at the lab this morning for blood draw. His only med is Lipitor 20 mg    Lab Results   Component Value Date    CHOL 158 08/16/2021    CHOL 163 02/15/2021    CHOL 136 05/13/2020     Lab Results   Component Value Date    TRIG 76 08/16/2021    TRIG 109 02/15/2021    TRIG 66 05/13/2020     Lab Results   Component Value Date    HDL 47 08/16/2021    HDL 45 02/15/2021    HDL 39 (L) 05/13/2020     Lab Results   Component Value Date    LDLCALC 96 08/16/2021    LDLCALC 96 02/15/2021    LDLCALC 84 05/13/2020     Lab Results   Component Value Date    LABVLDL 15 08/16/2021    LABVLDL 22 02/15/2021    LABVLDL 13 05/13/2020     No results found for: CHOLHDLRATIO     Lab Results   Component Value Date     08/16/2021    K 4.2 08/16/2021     08/16/2021    CO2 28 08/16/2021    BUN 17 08/16/2021    CREATININE 0.6 (L) 08/16/2021    GLUCOSE 99 08/16/2021    CALCIUM 9.5 08/16/2021    PROT 7.6 08/16/2021    LABALBU 4.7 08/16/2021    BILITOT 0.7 08/16/2021    ALKPHOS 68 08/16/2021    AST 31 08/16/2021    ALT 33 08/16/2021    LABGLOM >60 08/16/2021    GFRAA >60 08/16/2021    AGRATIO 1.6 08/16/2021    GLOB 2.9 08/16/2021       No results found for: LABA1C  No results found for: EAG       1. Hyperlipidemia, unspecified hyperlipidemia type  At this time he will continue current Lipitor 20 mg daily  He will watch for lab results in [email or web address] and send any question via [email or web address]  When I get results I weill contact patient with any needed change, titration or addition  Return to see Dr Grier in 6 months    2.  Immunization due    - Tdap (age 6y and older) IM (BOOSTRIX)      /84   Pulse 63   Temp 97.3 °F (36.3 °C)   Wt 220 lb Housing Stability:     Unable to Pay for Housing in the Last Year: Not on file    Number of Places Lived in the Last Year: Not on file    Unstable Housing in the Last Year: Not on file       Family History   Problem Relation Age of Onset    Cancer Mother         Breast, Thyroid, Skin, Uterine CA    Heart Disease Father 67        MI    Cancer Father         Skin    High Blood Pressure Father     Other Brother         Kidney Stones       Past Medical History:   Diagnosis Date    Acne     DR. Shruthi Baird    Basal cell adenocarcinoma     Skin    Family history of early CAD     Hyperlipidemia     Malignant melanoma ([facility] [place] [address] 2009    Treatment surgery and chemotherapy Dr. Romi Person Polymyositis ([facility] [place] [address] 1975    DX at 6years old    Varicose veins        Review of Systems   Constitutional: Negative for fever. HENT: Negative for congestion. Eyes: Negative for discharge. Respiratory: Negative for cough, shortness of breath and wheezing. Cardiovascular: Negative for chest pain, palpitations and leg swelling. Gastrointestinal: Negative for abdominal pain, blood in stool, constipation, diarrhea, nausea and vomiting. Genitourinary: Negative for dysuria and hematuria. Musculoskeletal: Negative for myalgias. Skin: Negative for rash. Neurological: Negative for dizziness. Psychiatric/Behavioral: The patient is not nervous/anxious. Physical Exam  Constitutional:       General: He is not in acute distress. Appearance: He is not diaphoretic. HENT:      Right Ear: External ear normal.      Left Ear: External ear normal.      Mouth/Throat:      Pharynx: No oropharyngeal exudate. Eyes:      General: No scleral icterus. Right eye: No discharge. Left eye: No discharge. Neck:      Thyroid: No thyromegaly. Cardiovascular:      Rate and Rhythm: Normal rate and regular rhythm. Heart sounds: Normal heart sounds. No murmur heard. No friction rub. No gallop.     Pulmonary:      Effort: Pulmonary effort is normal.      Breath sounds: Normal breath sounds. No wheezing. Abdominal:      General: Bowel sounds are normal. There is no distension. Palpations: Abdomen is soft. Tenderness: There is no abdominal tenderness. Musculoskeletal:         General: No tenderness. Normal range of motion. Cervical back: Normal range of motion. Lymphadenopathy:      Cervical: No cervical adenopathy. Skin:     General: Skin is warm and dry. Findings: No erythema or rash. Neurological:      Mental Status: He is alert and oriented to person, place, and time.    Psychiatric:         Judgment: Judgment normal.                MANUELITO Schulz - CNP

## 2022-06-26 DIAGNOSIS — E78.41 ELEVATED LIPOPROTEIN(A): ICD-10-CM

## 2022-06-27 RX ORDER — ATORVASTATIN CALCIUM 20 MG/1
TABLET, FILM COATED ORAL
Qty: 90 TABLET | Refills: 1 | Status: SHIPPED | OUTPATIENT
Start: 2022-06-27

## 2022-06-27 NOTE — TELEPHONE ENCOUNTER
Refill request for atorvastatin  medication.      Name of Pharmacy- [facility]       Last visit - 3-3-2022     Pending visit - 3-9-2023    Last refill -11-8-2021      Medication Contract signed -   Last Thomas stone-         Additional Comments
In my judgment no risk for PPH has been identified at this time.

## 2022-12-19 DIAGNOSIS — E78.41 ELEVATED LIPOPROTEIN(A): ICD-10-CM

## 2022-12-19 NOTE — TELEPHONE ENCOUNTER
Refill request for lipitor medication.      Name of Pharmacy- [facility]      Last visit - 8/24/22     Pending visit - 3/9/23    Last refill -6/27/22  Medication Contract signed -   Last Oarrs ran-         Additional Comments

## 2022-12-20 DIAGNOSIS — Z12.5 SPECIAL SCREENING FOR MALIGNANT NEOPLASM OF PROSTATE: ICD-10-CM

## 2022-12-20 DIAGNOSIS — E78.5 HYPERLIPIDEMIA, UNSPECIFIED HYPERLIPIDEMIA TYPE: Primary | ICD-10-CM

## 2022-12-20 RX ORDER — ATORVASTATIN CALCIUM 20 MG/1
TABLET, FILM COATED ORAL
Qty: 90 TABLET | Refills: 0 | Status: SHIPPED | OUTPATIENT
Start: 2022-12-20

## 2022-12-21 NOTE — TELEPHONE ENCOUNTER
I left a VM for the patient to call our office because he is past due for fasting laboratory studies and office visit with Dr. Otto Barrientos. Labs are ordered in [facility], make appointment for OV with Dr. Otto Barrientos for December/January.

## 2022-12-21 NOTE — TELEPHONE ENCOUNTER
Please call patient: Past due for fasting laboratory studies and office visit with me in September.   Order for fasting laboratory studies, ordered in epic, before office visit with me sometime December/January  Dr. Sha Espinoza

## 2022-12-22 NOTE — TELEPHONE ENCOUNTER
2nd call    Left a follow-up VM to call our office to schedule Dr. Shweta Majano for an office visit as he's past due.     [facility] message sent

## 2023-01-06 ENCOUNTER — APPOINTMENT (OUTPATIENT)
Dept: CT IMAGING | Age: 58
End: 2023-01-06
Payer: COMMERCIAL

## 2023-01-06 ENCOUNTER — HOSPITAL ENCOUNTER (EMERGENCY)
Age: 58
Discharge: HOME | End: 2023-01-06
Payer: COMMERCIAL

## 2023-01-06 ENCOUNTER — APPOINTMENT (OUTPATIENT)
Dept: ULTRASOUND IMAGING | Age: 58
End: 2023-01-06
Payer: COMMERCIAL

## 2023-01-06 VITALS
SYSTOLIC BLOOD PRESSURE: 145 MMHG | HEART RATE: 84 BPM | DIASTOLIC BLOOD PRESSURE: 82 MMHG | OXYGEN SATURATION: 94 % | TEMPERATURE: 98 F | WEIGHT: 220 LBS | BODY MASS INDEX: 29.8 KG/M2 | RESPIRATION RATE: 18 BRPM | HEIGHT: 72 IN

## 2023-01-06 DIAGNOSIS — R10.84 GENERALIZED ABDOMINAL PAIN: Primary | ICD-10-CM

## 2023-01-06 DIAGNOSIS — N20.1 LEFT URETERAL CALCULUS: ICD-10-CM

## 2023-01-06 DIAGNOSIS — R11.0 NAUSEA WITHOUT VOMITING: ICD-10-CM

## 2023-01-06 LAB
A/G RATIO: 2.1 (ref 1.1–2.2)
ALBUMIN SERPL-MCNC: 5.2 G/DL (ref 3.4–5)
ALP BLD-CCNC: 74 U/L (ref 40–129)
ALT SERPL-CCNC: 36 U/L (ref 10–40)
ANION GAP SERPL CALCULATED.3IONS-SCNC: 10 MMOL/L (ref 3–16)
AST SERPL-CCNC: 26 U/L (ref 15–37)
BASOPHILS ABSOLUTE: 0 K/UL (ref 0–0.2)
BASOPHILS RELATIVE PERCENT: 0.3 %
BILIRUB SERPL-MCNC: 1 MG/DL (ref 0–1)
BILIRUBIN URINE: NEGATIVE
BLOOD, URINE: NEGATIVE
BUN BLDV-MCNC: 22 MG/DL (ref 7–20)
CALCIUM SERPL-MCNC: 10.1 MG/DL (ref 8.3–10.6)
CHLORIDE BLD-SCNC: 98 MMOL/L (ref 99–110)
CLARITY: CLEAR
CO2: 28 MMOL/L (ref 21–32)
COLOR: YELLOW
CREAT SERPL-MCNC: 1 MG/DL (ref 0.9–1.3)
EOSINOPHILS ABSOLUTE: 0 K/UL (ref 0–0.6)
EOSINOPHILS RELATIVE PERCENT: 0.2 %
GFR SERPL CREATININE-BSD FRML MDRD: >60 ML/MIN/{1.73_M2}
GLUCOSE BLD-MCNC: 112 MG/DL (ref 70–99)
GLUCOSE URINE: NEGATIVE MG/DL
HCT VFR BLD CALC: 45.2 % (ref 40.5–52.5)
HEMOGLOBIN: 15.6 G/DL (ref 13.5–17.5)
KETONES, URINE: NEGATIVE MG/DL
LEUKOCYTE ESTERASE, URINE: NEGATIVE
LIPASE: 17 U/L (ref 13–60)
LYMPHOCYTES ABSOLUTE: 0.8 K/UL (ref 1–5.1)
LYMPHOCYTES RELATIVE PERCENT: 5.5 %
MCH RBC QN AUTO: 29.5 PG (ref 26–34)
MCHC RBC AUTO-ENTMCNC: 34.5 G/DL (ref 31–36)
MCV RBC AUTO: 85.6 FL (ref 80–100)
MICROSCOPIC EXAMINATION: NORMAL
MONOCYTES ABSOLUTE: 0.8 K/UL (ref 0–1.3)
MONOCYTES RELATIVE PERCENT: 6.2 %
NEUTROPHILS ABSOLUTE: 12 K/UL (ref 1.7–7.7)
NEUTROPHILS RELATIVE PERCENT: 87.8 %
NITRITE, URINE: NEGATIVE
PDW BLD-RTO: 13.2 % (ref 12.4–15.4)
PH UA: 7 (ref 5–8)
PLATELET # BLD: 316 K/UL (ref 135–450)
PMV BLD AUTO: 6.9 FL (ref 5–10.5)
POTASSIUM SERPL-SCNC: 4.3 MMOL/L (ref 3.5–5.1)
PROTEIN UA: NEGATIVE MG/DL
RBC # BLD: 5.27 M/UL (ref 4.2–5.9)
SODIUM BLD-SCNC: 136 MMOL/L (ref 136–145)
SPECIFIC GRAVITY UA: 1.02 (ref 1–1.03)
TOTAL PROTEIN: 7.7 G/DL (ref 6.4–8.2)
URINE REFLEX TO CULTURE: NORMAL
URINE TYPE: NORMAL
UROBILINOGEN, URINE: 0.2 E.U./DL
WBC # BLD: 13.7 K/UL (ref 4–11)

## 2023-01-06 PROCEDURE — 6360000002 HC RX W HCPCS: Performed by: NURSE PRACTITIONER

## 2023-01-06 PROCEDURE — 83690 ASSAY OF LIPASE: CPT

## 2023-01-06 PROCEDURE — 85025 COMPLETE CBC W/AUTO DIFF WBC: CPT

## 2023-01-06 PROCEDURE — 74177 CT ABD & PELVIS W/CONTRAST: CPT

## 2023-01-06 PROCEDURE — 99285 EMERGENCY DEPT VISIT HI MDM: CPT

## 2023-01-06 PROCEDURE — 96374 THER/PROPH/DIAG INJ IV PUSH: CPT

## 2023-01-06 PROCEDURE — 81003 URINALYSIS AUTO W/O SCOPE: CPT

## 2023-01-06 PROCEDURE — 6360000004 HC RX CONTRAST MEDICATION: Performed by: NURSE PRACTITIONER

## 2023-01-06 PROCEDURE — 76705 ECHO EXAM OF ABDOMEN: CPT

## 2023-01-06 PROCEDURE — 80053 COMPREHEN METABOLIC PANEL: CPT

## 2023-01-06 RX ORDER — KETOROLAC TROMETHAMINE 30 MG/ML
30 INJECTION, SOLUTION INTRAMUSCULAR; INTRAVENOUS ONCE
Status: COMPLETED | OUTPATIENT
Start: 2023-01-06 | End: 2023-01-06

## 2023-01-06 RX ORDER — MORPHINE SULFATE 4 MG/ML
4 INJECTION, SOLUTION INTRAMUSCULAR; INTRAVENOUS EVERY 30 MIN PRN
Status: DISCONTINUED | OUTPATIENT
Start: 2023-01-06 | End: 2023-01-06 | Stop reason: HOSPADM

## 2023-01-06 RX ORDER — OXYCODONE HYDROCHLORIDE AND ACETAMINOPHEN 5; 325 MG/1; MG/1
1-2 TABLET ORAL EVERY 6 HOURS PRN
Qty: 20 TABLET | Refills: 0 | Status: SHIPPED | OUTPATIENT
Start: 2023-01-06 | End: 2023-01-06 | Stop reason: SDUPTHER

## 2023-01-06 RX ORDER — ONDANSETRON 4 MG/1
4 TABLET, FILM COATED ORAL EVERY 8 HOURS PRN
Qty: 20 TABLET | Refills: 0 | Status: SHIPPED | OUTPATIENT
Start: 2023-01-06

## 2023-01-06 RX ORDER — TAMSULOSIN HYDROCHLORIDE 0.4 MG/1
0.4 CAPSULE ORAL DAILY
Qty: 5 CAPSULE | Refills: 0 | Status: SHIPPED | OUTPATIENT
Start: 2023-01-06 | End: 2023-01-06 | Stop reason: SDUPTHER

## 2023-01-06 RX ORDER — KETOROLAC TROMETHAMINE 10 MG/1
10 TABLET, FILM COATED ORAL EVERY 6 HOURS PRN
Qty: 20 TABLET | Refills: 0 | Status: SHIPPED | OUTPATIENT
Start: 2023-01-06 | End: 2024-01-06

## 2023-01-06 RX ORDER — KETOROLAC TROMETHAMINE 10 MG/1
10 TABLET, FILM COATED ORAL EVERY 6 HOURS PRN
Qty: 20 TABLET | Refills: 0 | Status: SHIPPED | OUTPATIENT
Start: 2023-01-06 | End: 2023-01-06 | Stop reason: SDUPTHER

## 2023-01-06 RX ORDER — ONDANSETRON 2 MG/ML
4 INJECTION INTRAMUSCULAR; INTRAVENOUS ONCE
Status: DISCONTINUED | OUTPATIENT
Start: 2023-01-06 | End: 2023-01-06 | Stop reason: HOSPADM

## 2023-01-06 RX ORDER — TAMSULOSIN HYDROCHLORIDE 0.4 MG/1
0.4 CAPSULE ORAL DAILY
Qty: 5 CAPSULE | Refills: 0 | Status: SHIPPED | OUTPATIENT
Start: 2023-01-06 | End: 2023-01-11

## 2023-01-06 RX ORDER — ONDANSETRON 4 MG/1
4 TABLET, FILM COATED ORAL EVERY 8 HOURS PRN
Qty: 20 TABLET | Refills: 0 | Status: SHIPPED | OUTPATIENT
Start: 2023-01-06 | End: 2023-01-06 | Stop reason: SDUPTHER

## 2023-01-06 RX ORDER — OXYCODONE HYDROCHLORIDE AND ACETAMINOPHEN 5; 325 MG/1; MG/1
1-2 TABLET ORAL EVERY 6 HOURS PRN
Qty: 20 TABLET | Refills: 0 | Status: SHIPPED | OUTPATIENT
Start: 2023-01-06 | End: 2023-01-09

## 2023-01-06 RX ADMIN — KETOROLAC TROMETHAMINE 30 MG: 30 INJECTION, SOLUTION INTRAMUSCULAR; INTRAVENOUS at 16:30

## 2023-01-06 RX ADMIN — IOPAMIDOL 75 ML: 755 INJECTION, SOLUTION INTRAVENOUS at 15:21

## 2023-01-06 ASSESSMENT — ENCOUNTER SYMPTOMS
ABDOMINAL PAIN: 1
VOMITING: 0
WHEEZING: 0
SHORTNESS OF BREATH: 0
NAUSEA: 0
COLOR CHANGE: 0
DIARRHEA: 0
COUGH: 0
BACK PAIN: 0

## 2023-01-06 ASSESSMENT — PAIN DESCRIPTION - LOCATION
LOCATION: ABDOMEN
LOCATION: ABDOMEN

## 2023-01-06 ASSESSMENT — PAIN DESCRIPTION - DESCRIPTORS: DESCRIPTORS: ACHING

## 2023-01-06 ASSESSMENT — PAIN - FUNCTIONAL ASSESSMENT: PAIN_FUNCTIONAL_ASSESSMENT: 0-10

## 2023-01-06 ASSESSMENT — PAIN SCALES - GENERAL
PAINLEVEL_OUTOF10: 7
PAINLEVEL_OUTOF10: 8

## 2023-01-06 ASSESSMENT — PAIN DESCRIPTION - ORIENTATION: ORIENTATION: LEFT;UPPER

## 2023-01-06 NOTE — ED NOTES
Pt d/c. CAPOS reviewed and signed, all questions answered. NAD noted.      Mary Izquierdo RN  01/06/23 [id number]

## 2023-01-06 NOTE — ED PROVIDER NOTES
**ADVANCED PRACTICE PROVIDER, I HAVE EVALUATED THIS [facility]  ED  EMERGENCY DEPARTMENT ENCOUNTER      Pt Name: Lashell Reid  FTI:[id number]  [place] 1965  Date of evaluation: 1/6/2023  Provider: MANUELITO Lawrence CNP  Note Started: 1:52 PM EST 1/6/2023        Chief Complaint:    Chief Complaint   Patient presents with    Abdominal Pain     Left abd pain started on Wednesday, got better on Thursday came back today ,          Nursing Notes, Past Medical Hx, Past Surgical Hx, Social Hx, Allergies, and Family Hx were all reviewed and agreed with or any disagreements were addressed in the HPI.    HPI: (Location, Duration, Timing, Severity, Quality, Assoc Sx, Context, Modifying factors)    History From: patient  Limitations to history : None    Chief Complaint of left lateral mid abdominal pain    This is a  65 y.o. male who presents to the emergency department with left lower quadrant abdominal pain, no nausea vomiting or diarrhea, patient has been dealing with pain for the past 3 days, he denies any cough, congestion, fever or chills, no chest pain pleuritic chest pain or shortness of breath. Has not taken anything for the pain, his wife is at the bedside and is concerned patient has diverticulosis or diverticulitis however the patient has no history of this. Rates abdominal pain an 8 out of 10, describes as a gnawing deep pain, denies taking any medicine for symptoms. Patient denies any urinary symptoms, he denies any nausea vomiting or diarrhea, no urinary symptoms, no additional complaints, no additional aggravating relieving factors. Patient presents awake, alert and in no acute respiratory distress or toxic appearance    PastMedical/Surgical History:      Diagnosis Date    Acne     DR. Traore    Basal cell adenocarcinoma     Skin    Family history of early CAD     Hyperlipidemia     Malignant melanoma ([facility] [address] 2009    Treatment surgery and chemotherapy Dr. Gomez Abt Polymyositis ([facility] [address] 1975    DX at 6years old    Varicose veins          Procedure Laterality Date    APPENDECTOMY      COLONOSCOPY N/A 12/3/2020    COLONOSCOPY POLYPECTOMY SNARE/COLD BIOPSY performed by Navjot Hyman MD at [facility] BIOPSY  2009    Polymyositis    SKIN CANCER EXCISION  2012    Basal cell-left temporal area    SKIN CANCER EXCISION  2009    Nodular malignant melanoma: Surgery, chemotherapy Dr. Cecile Gustafson       Medications:  Discharge Medication List as of 1/6/2023  6:32 PM        CONTINUE these medications which have NOT CHANGED    Details   atorvastatin (LIPITOR) 20 MG tablet TAKE 1 TABLET BY MOUTH EVERY DAY. FOR HIGH CHOLESTEROL, Disp-90 tablet, R-0No Refill See Dr. Cayla Jaquez             Review of Systems:  (1 systems needed)  Review of Systems   Constitutional:  Negative for chills and fever. HENT:  Negative for congestion. Respiratory:  Negative for cough, shortness of breath and wheezing. Cardiovascular:  Negative for chest pain. Gastrointestinal:  Positive for abdominal pain. Negative for diarrhea, nausea and vomiting. Patient complains of left lower quadrant abdominal pain, no nausea vomiting or diarrhea, pain is more in the middle of the left lateral side of the abdomen   Genitourinary:  Negative for dysuria, frequency, hematuria and urgency. Musculoskeletal:  Negative for back pain. Skin:  Negative for color change. Neurological:  Negative for weakness, numbness and headaches. \"Positives and Pertinent negatives as per HPI\"    Physical Exam:  Physical Exam  Vitals and nursing note reviewed. Constitutional:       Appearance: He is well-developed. He is not diaphoretic. HENT:      Head: Normocephalic. Right Ear: External ear normal.      Left Ear: External ear normal.   Eyes:      General: No scleral icterus. Right eye: No discharge. Left eye: No discharge.    Cardiovascular: Rate and Rhythm: Normal rate and regular rhythm. Pulmonary:      Effort: Pulmonary effort is normal. No respiratory distress. Breath sounds: Normal breath sounds. Abdominal:      Palpations: Abdomen is soft. Tenderness: There is abdominal tenderness. Comments: Abdomen is generally soft and nondistended. Bowel sounds are hypoactive, he does have tenderness in the left upper quadrant in the middle aspect of the left side of the abdomen on exam however there is no rebound tenderness at Monroe's point, no ascites or rigidity. Musculoskeletal:         General: Normal range of motion. Cervical back: Normal range of motion and neck supple. Skin:     General: Skin is warm. Capillary Refill: Capillary refill takes less than 2 seconds. Coloration: Skin is not pale. Neurological:      Mental Status: He is alert and oriented to person, place, and time. GCS: GCS eye subscore is 4. GCS verbal subscore is 5. GCS motor subscore is 6. Cranial Nerves: Cranial nerves 2-12 are intact.       Comments: Patient presents awake, alert and in no acute distress or toxic appearance   Psychiatric:         Behavior: Behavior normal.       MEDICAL DECISION MAKING    Vitals:    Vitals:    01/06/23 1319 01/06/23 1320 01/06/23 1835   BP:  (!) 153/97 (!) 145/82   Pulse: 87  84   Resp: 18     Temp: 98 °F (36.7 °C)     TempSrc: Oral     SpO2: 99%  94%   Weight: 220 lb (99.8 kg)     Height: 6' (1.829 m)         LABS:  Labs Reviewed   CBC WITH AUTO DIFFERENTIAL - Abnormal; Notable for the following components:       Result Value    WBC 13.7 (*)     Neutrophils Absolute 12.0 (*)     Lymphocytes Absolute 0.8 (*)     All other components within normal limits   COMPREHENSIVE METABOLIC PANEL - Abnormal; Notable for the following components:    Chloride 98 (*)     Glucose 112 (*)     BUN 22 (*)     Albumin 5.2 (*)     All other components within normal limits   LIPASE   URINALYSIS WITH REFLEX TO CULTURE Remainder of labs reviewed and were negative at this time or not returned at the time of this note. RADIOLOGY:   Non-plain film images such as CT, Ultrasound and MRI are read by the radiologist. Malika CHAVIS APRN - CNP have directly visualized the radiologic plain film image(s) with the below findings:      Interpretation per the Radiologist below, if available at the time of this note:    [address]   Final Result   Unremarkable right upper quadrant ultrasound. No sonographic evidence of   cholelithiasis or acute cholecystitis seen. CT ABDOMEN PELVIS W IV CONTRAST Additional Contrast? None   Preliminary Result   1. Findings consistent with the presence of left-sided obstructive uropathy   which is felt to be related to a 4 mm calculus in the region of the left   ureterovesical junction as described above. 2. Prominence of mural thickness of the urinary bladder. Finding in part   likely related to decompressed state of the bladder. True mural thickening   cannot be excluded based on this examination. MEDICAL DECISION MAKING / ED COURSE:    Because of high probability of sudden clinical deterioration of the patient's condition and risk of further deterioration, critical care time required my full attention to the patient's condition; which included chart data review, documentation, medication ordering, reviewing the patient's old records, reevaluation patient's cardiac, pulmonary and neurological status. Reevaluation of vital signs. Consultations with ED attending and admitting physician. Ordering, interpreting reviewing diagnostic testing. Therefore, I personally saw the patient and independently provided 22 minutes of non-concurrent critical care out of the total shared critical care time provided, direct attention to the patient's condition did not include time spent on procedures.     PROCEDURES:   Procedures    None    Patient was given:  Medications   ondansetron [facility]) injection 4 mg (4 mg IntraVENous Not Given 1/6/23 1356)   morphine sulfate (PF) injection 4 mg (has no administration in time range)   iopamidol (ISOVUE-370) 76 % injection 75 mL (75 mLs IntraVENous Given 1/6/23 1521)   ketorolac (TORADOL) injection 30 mg (30 mg IntraVENous Given 1/6/23 1630)       CONSULTS: (Who and What was discussed)  None          Chronic Conditions affecting care:    has a past medical history of Acne, Basal cell adenocarcinoma, Family history of early CAD, Hyperlipidemia, Malignant melanoma ([facility] [address] (2009), Polymyositis ([facility] [address] (1975), and Varicose veins. Patient presents with left lower quadrant abdominal pain, no nausea vomiting or diarrhea, patient has been dealing with pain for the past 3 days, he denies any cough, congestion, fever or chills, no chest pain pleuritic chest pain or shortness of breath. Has not taken anything for the pain, his wife is at the bedside and is concerned patient has diverticulosis or diverticulitis however the patient has no history of this. Rates abdominal pain an 8 out of 10    After evaluation and examination the patient IV access, blood work, urinalysis, medications and CT scan abdomen ordered, patient was ordered morphine and Zofran IV. CBC shows a leukocytosis with a white count 13,700, no acute anemia. Urinalysis shows no infection. Metabolic panel shows a BUN of 22, chloride of 98, this will improve with IV fluids. Liver functions and lipase are normal.  CT the abdomen shows findings consistent with the presence of a left-sided obstructive uropathy, most likely related to 4 mm calculus in the region of the left UVJ. He does have some renal thickening of the urinary bladder however his urinalysis is unremarkable little concerns for cystitis. Gallbladder ultrasound is unremarkable, no evidence of cholelithiasis or acute cholecystitis.   Patient was given medications, I gave him Toradol which seemed to help more with the pain, I will send him home with the same medication. However, at this time no concerns for acute surgical abdomen, sepsis or other emergent etiology, I do not think it is necessary to do any additional diagnostics or imaging and patient can go home with outpatient follow-up. Therefore, shared medical decision was made between the patient's wife, patient and myself and we agreed the patient could be discharged home with outpatient follow-up. She was discharged home referral back to PCP and urology, call first thing in the morning for an appointment. Discharged home with Toradol, Zofran, Percocet and Flomax, educated take medicine as prescribed. Return to the ER for worsening or concerning symptoms. The patient tolerated their visit well. I evaluated the patient. The physician was available for consultation as needed. The patient and / or the family were informed of the results of any tests, a time was given to answer questions, a plan was proposed and they agreed with plan. Patient and family verbalized understanding of discharge instructions and the patient was discharged from the department in stable condition. I am the Primary Clinician of Record. CLINICAL IMPRESSION:  1. Generalized abdominal pain    2. Left ureteral calculus    3.  Nausea without vomiting        DISPOSITION Decision To Discharge 01/06/2023 06:07:28 PM      PATIENT REFERRED TO:  Rizwana Grier MD  [address] [place] [address] [address]    Schedule an appointment as soon as possible for a visit   As needed    [facility] [place] [address]  [phone]    This is a urology referral, follow-up with anyone in the group in the next 2 days for reevaluation    [facility]  ED  [facility] [address]  Go to   If symptoms worsen    DISCHARGE MEDICATIONS:  Discharge Medication List as of 1/6/2023  6:32 PM          DISCONTINUED MEDICATIONS:  Discharge Medication List as of 1/6/2023  6:32 PM                 (Please note the MDM and HPI sections of this note were completed with a voice recognition program.  Efforts were made to edit the dictations but occasionally words are mis-transcribed.)    Electronically signed, MANUELITO Coello CNP,           Steve Paramjit, APRN - CNP  01/06/23 Pradeep Boateng 92., APRN - CNP  01/06/23 [id number]

## 2023-01-18 ENCOUNTER — HOSPITAL ENCOUNTER (OUTPATIENT)
Dept: GENERAL RADIOLOGY | Age: 58
Discharge: HOME | End: 2023-01-18
Payer: COMMERCIAL

## 2023-01-18 DIAGNOSIS — N13.2 HYDRONEPHROSIS CONCURRENT WITH AND DUE TO CALCULI OF KIDNEY AND URETER: ICD-10-CM

## 2023-01-18 PROCEDURE — 74018 RADEX ABDOMEN 1 VIEW: CPT

## 2023-02-02 ENCOUNTER — HOSPITAL ENCOUNTER (OUTPATIENT)
Dept: ULTRASOUND IMAGING | Age: 58
Discharge: HOME | End: 2023-02-02
Payer: COMMERCIAL

## 2023-02-02 ENCOUNTER — HOSPITAL ENCOUNTER (OUTPATIENT)
Age: 58
Discharge: HOME | End: 2023-02-02
Payer: COMMERCIAL

## 2023-02-02 DIAGNOSIS — N13.2 HYDRONEPHROSIS WITH RENAL AND URETERAL CALCULUS OBSTRUCTION: ICD-10-CM

## 2023-02-02 PROCEDURE — 76770 US EXAM ABDO BACK WALL COMP: CPT

## 2023-02-23 ENCOUNTER — HOSPITAL ENCOUNTER (OUTPATIENT)
Age: 58
Discharge: HOME | End: 2023-02-23
Payer: COMMERCIAL

## 2023-02-23 DIAGNOSIS — Z12.5 SPECIAL SCREENING FOR MALIGNANT NEOPLASM OF PROSTATE: ICD-10-CM

## 2023-02-23 DIAGNOSIS — E78.5 HYPERLIPIDEMIA, UNSPECIFIED HYPERLIPIDEMIA TYPE: ICD-10-CM

## 2023-02-23 LAB
A/G RATIO: 2.1 (ref 1.1–2.2)
ALBUMIN SERPL-MCNC: 4.7 G/DL (ref 3.4–5)
ALP BLD-CCNC: 59 U/L (ref 40–129)
ALT SERPL-CCNC: 37 U/L (ref 10–40)
ANION GAP SERPL CALCULATED.3IONS-SCNC: 13 MMOL/L (ref 3–16)
AST SERPL-CCNC: 22 U/L (ref 15–37)
BILIRUB SERPL-MCNC: 0.6 MG/DL (ref 0–1)
BUN BLDV-MCNC: 19 MG/DL (ref 7–20)
CALCIUM SERPL-MCNC: 9.4 MG/DL (ref 8.3–10.6)
CHLORIDE BLD-SCNC: 102 MMOL/L (ref 99–110)
CHOLESTEROL, TOTAL: 118 MG/DL (ref 0–199)
CO2: 25 MMOL/L (ref 21–32)
CREAT SERPL-MCNC: 0.6 MG/DL (ref 0.9–1.3)
GFR SERPL CREATININE-BSD FRML MDRD: >60 ML/MIN/{1.73_M2}
GLUCOSE BLD-MCNC: 84 MG/DL (ref 70–99)
HDLC SERPL-MCNC: 46 MG/DL (ref 40–60)
LDL CHOLESTEROL CALCULATED: 60 MG/DL
POTASSIUM SERPL-SCNC: 4.9 MMOL/L (ref 3.5–5.1)
PROSTATE SPECIFIC ANTIGEN: 0.86 NG/ML (ref 0–4)
SODIUM BLD-SCNC: 140 MMOL/L (ref 136–145)
TOTAL PROTEIN: 6.9 G/DL (ref 6.4–8.2)
TRIGL SERPL-MCNC: 62 MG/DL (ref 0–150)
VLDLC SERPL CALC-MCNC: 12 MG/DL

## 2023-02-23 PROCEDURE — 84153 ASSAY OF PSA TOTAL: CPT

## 2023-02-23 PROCEDURE — 80061 LIPID PANEL: CPT

## 2023-02-23 PROCEDURE — 80053 COMPREHEN METABOLIC PANEL: CPT

## 2023-02-23 PROCEDURE — 36415 COLL VENOUS BLD VENIPUNCTURE: CPT

## 2023-03-08 NOTE — PROGRESS NOTES
History and Physical      Long Oneal  YOB: 1965    Date of Service:  3/9/2023    Chief Complaint:   Long Oneal is a 57 y.o. male who presents for complete physical examination.    HPI:            Patient here for annual exam.  Medicines and Allergies Reviewed:  Laboratory Data Reviewed: 2/23/2023: Chemistry panel unremarkable.  Lipid panel: Total cholesterol: 118.  LDL cholesterol 60.  PSA 0.86  Reviewed Health Maintenance: Shingles vaccine.  Influenza vaccine.  #1.  He notes difficulty with falling asleep in the evening after eating dinner.  He denies snoring and apneic spells but his bed partner cannot give history as she snores herself and takes out her hearing aids at night.  He is going to bed at 10 PM up at 5:30 AM awakens once to urinate and returns to sleep easily.  He walks 7 to 10 miles a day on his job.  2.  Reviewed: 1/6/2023 seen in emergency room [facility] complaint of left abdominal pain.  Work-up: RUQ ultrasound unremarkable.  CT scan abdomen/pelvis left-sided obstructive uropathy 4 mm calculus region of left UVJ patient states he apparently passed a stone and was not recovered.  Prominent family history for kidney stones in father as well as brother.    Wt Readings from Last 3 Encounters:   03/09/23 222 lb 9.6 oz (101 kg)   01/06/23 220 lb (99.8 kg)   03/03/22 220 lb (99.8 kg)     BP Readings from Last 3 Encounters:   03/09/23 136/82   01/06/23 (!) 145/82   03/03/22 134/84       Patient Active Problem List   Diagnosis    Hyperlipidemia    Family history of early CAD    Polymyositis (HCC)    Basal cell adenocarcinoma    Malignant melanoma (HCC)    Varicose veins    Acne    Cecal polyp    Family history of basal cell carcinoma    Family history of thyroid cancer    Neoplasm of uncertain behavior of skin    Personal history of malignant melanoma of skin    Primary osteoarthritis of left knee       Preventive Care:  Health Maintenance   Topic Date Due     Hepatitis C screen  Never done    COVID-19 Vaccine (3 - Booster for Moderna series) 07/06/2021    Flu vaccine (1) 08/01/2022    Shingles vaccine (2 of 2) 05/04/2023    Lipids  02/23/2024    Depression Screen  03/09/2024    Colorectal Cancer Screen  12/04/2025    DTaP/Tdap/Td vaccine (2 - Td or Tdap) 03/03/2032    HIV screen  Completed    Hepatitis A vaccine  Aged Out    Hib vaccine  Aged Out    Meningococcal (ACWY) vaccine  Aged Out    Pneumococcal 0-64 years Vaccine  Aged Out      Self-testicular exams: Yes  Sexual activity:  y  Last eye exam: , 3yr  Exercise: y  Seatbelt use: y  Lipid panel:    Lab Results   Component Value Date    CHOL 118 02/23/2023    TRIG 62 02/23/2023    HDL 46 02/23/2023    [address] 60 02/23/2023       Living will: n    Immunization History   Administered Date(s) Administered    COVID-19, MODERNA BLUE border, Primary or Immunocompromised, (age 12y+), IM, 100 mcg/0.5mL 04/13/2021, 05/11/2021    Influenza A (L2A0-03) Vaccine PF IM 11/05/2009    Influenza, FLUARIX, FLULAVAL, FLUZONE (age 10 mo+) AND AFLURIA, (age 1 y+), PF, 0.5mL 01/21/2019    Td (Adult), 5 Lf Tetanus Toxoid, Pf (Tenivac, Decavac) 11/14/2007    Td, unspecified formulation 11/14/2007    Tdap (Boostrix, Adacel) 03/03/2022    Zoster Recombinant (Shingrix) 03/09/2023       No Known Allergies  No outpatient medications have been marked as taking for the 3/9/23 encounter (Office Visit) with Bertha Mayen MD.       Past Medical History:   Diagnosis Date    Acne     DR. Traore    Basal cell adenocarcinoma     Skin    Family history of early CAD     Hyperlipidemia     Malignant melanoma ([facility] [address] 2009    Treatment surgery and chemotherapy Dr. Chris Stephens    Polymyositis [facility]) 1975    DX at 6years old    Varicose veins      Past Surgical History:   Procedure Laterality Date    APPENDECTOMY      COLONOSCOPY N/A 12/3/2020    COLONOSCOPY POLYPECTOMY SNARE/COLD BIOPSY performed by Marquis Rodrigues MD at [place] [address] HERNIA REPAIR      MUSCLE BIOPSY  2009    Polymyositis    SKIN CANCER EXCISION  2012    Basal cell-left temporal area    SKIN CANCER EXCISION  2009    Nodular malignant melanoma: Surgery, chemotherapy Dr. Sheyla Redi     Family History   Problem Relation Age of Onset    Cancer Mother         Breast, Thyroid, Skin, Uterine CA    Heart Disease Father 67        MI    Cancer Father         Skin    High Blood Pressure Father     Other Brother         Kidney Stones     Social History     Socioeconomic History    Marital status:      Spouse name: Not on file    Number of children: Not on file    Years of education: Not on file    Highest education level: Not on file   Occupational History    Not on file   Tobacco Use    Smoking status: Never    Smokeless tobacco: Never   Substance and Sexual Activity    Alcohol use: Yes     Comment: Rare    Drug use: No    Sexual activity: Yes     Partners: Female   Other Topics Concern    Not on file   Social History Narrative    Not on file     Social Determinants of Health     Financial Resource Strain: Low Risk     Difficulty of Paying Living Expenses: Not hard at all   Food Insecurity: No Food Insecurity    Worried About [email or web address] in the Last Year: Never true    [address] in the Last Year: Never true   Transportation Needs: Unknown    Lack of Transportation (Medical): Not on file    Lack of Transportation (Non-Medical):  No   Physical Activity: Not on file   Stress: Not on file   Social Connections: Not on file   Intimate Partner Violence: Not on file   Housing Stability: Unknown    Unable to Pay for Housing in the Last Year: Not on file    Number of Places Lived in the Last Year: Not on file    Unstable Housing in the Last Year: No       Review of Systems:  Constitutional: negative  HENT: negative  EYES: negative  Respiratory: negative  Cardiovasular :Negative  Gastrointestinal: negative  Endocrine: negative  Musculoskeletal: negative  Skin: negative  Allergic/Immunological: negative  Hematological: negative  Psychiatric/Behavorial: negative  : Kidney stone. Seen in emergency room 1/2023 has passed stone  Renal: negative  CNS: negative    Physical Exam:   Blood pressure 136/82, pulse 76, temperature 97.4 °F (36.3 °C), temperature source Temporal, height 6' 1.2\" (1.859 m), weight 222 lb 9.6 oz (101 kg), SpO2 97 %. Constitutional: He is oriented to person, place, and time. He appears well-developed and well-nourished. No distress. HEENT:   Head: Normocephalic and atraumatic. Right Ear: Tympanic membrane, external ear and ear canal normal.   Left Ear: Tympanic membrane, external ear and ear canal normal.   Nose: Nose normal.   Mouth/Throat: Posterior pharynx is somewhat narrow. Oropharynx is clear and moist and mucous membranes are normal. No oropharyngeal exudate or posterior oropharyngeal erythema. He has no cervical adenopathy. Eyes: Conjunctivae and extraocular motions are normal. Pupils are equal, round, and reactive to light. Neck: Full passive range of motion without pain. Neck supple. No JVD present. Carotid bruit is not present. No mass and no thyromegaly present. Cardiovascular: Normal rate, regular rhythm, normal heart sounds and intact distal pulses. Exam reveals no gallop and no friction rub. No murmur heard. Pulmonary/Chest: Effort normal and breath sounds normal. No respiratory distress. He has no wheezes, rhonchi or rales. Abdominal: Soft, non-tender. Bowel sounds and aorta are normal. There is no organomegaly, mass or bruit. Genitourinary: Penis without lesions. Testicle without masses. No hernia. Rectal Exam: No rectal masses. Scant amount of stool present brown guaiac negative  Prostate Exam: Prostate is medium to large in size. Medium in firmness. No definite mass. Not tender to touch. .  Musculoskeletal: Normal range of motion, no synovitis. He exhibits no edema.    Neurological: He is alert and oriented to person, place, and time. He has normal reflexes. No cranial nerve deficit. Coordination normal.   Skin: Skin is warm, dry and intact. No suspicious lesions are noted. Psychiatric: He has a normal mood and affect.  His speech is normal and behavior is normal. Judgment, cognition and memory are normal.    Results for orders placed or performed during the hospital encounter of 02/23/23   Lipid Panel   Result Value Ref Range    Cholesterol, Total 118 0 - 199 mg/dL    Triglycerides 62 0 - 150 mg/dL    HDL 46 40 - 60 mg/dL    LDL Calculated 60 <100 mg/dL    VLDL Cholesterol Calculated 12 Not Established mg/dL   Comprehensive Metabolic Panel   Result Value Ref Range    Sodium 140 136 - 145 mmol/L    Potassium 4.9 3.5 - 5.1 mmol/L    Chloride 102 99 - 110 mmol/L    CO2 25 21 - 32 mmol/L    Anion Gap 13 3 - 16    Glucose 84 70 - 99 mg/dL    BUN 19 7 - 20 mg/dL    Creatinine 0.6 (L) 0.9 - 1.3 mg/dL    Est, Glom Filt Rate >60 >60    Calcium 9.4 8.3 - 10.6 mg/dL    Total Protein 6.9 6.4 - 8.2 g/dL    Albumin 4.7 3.4 - 5.0 g/dL    Albumin/Globulin Ratio 2.1 1.1 - 2.2    Total Bilirubin 0.6 0.0 - 1.0 mg/dL    Alkaline Phosphatase 59 40 - 129 U/L    ALT 37 10 - 40 U/L    AST 22 15 - 37 U/L   PSA Screening   Result Value Ref Range    PSA 0.86 0.00 - 4.00 ng/mL     Assessment/Plan:  Patient Active Problem List   Diagnosis    Hyperlipidemia    Family history of early CAD    Polymyositis ([facility] [address]    Basal cell adenocarcinoma    Malignant melanoma ([facility] [address]    Varicose veins    Acne    Cecal polyp    Family history of basal cell carcinoma    Family history of thyroid cancer    Neoplasm of uncertain behavior of skin    Personal history of malignant melanoma of skin    Primary osteoarthritis of left knee     Rebecca Arriaga was seen today for annual exam.    Diagnoses and all orders for this visit:    Annual physical exam    Hyperlipidemia, unspecified hyperlipidemia type              Obtain fasting laboratory studies and call me for results  - Lipid Panel; Future  -     Comprehensive Metabolic Panel; Future    Polymyositis ([address]             Asymptomatic at this time. Malignant melanoma of torso excluding breast [facility])              Follow-up with dermatology. Kidney stone on left side               Seen in emergency room 1/6/2023: Asymptomatic apparently has passed stone    Daytime sleepiness                Patient may be experiencing sleep apnea but unable to have witnessed his wife takes hearing aids at night. I suggested increase sleep by 1 hour if no improvement should obtain sleep study    Need for shingles vaccine            Health maintenance.   Immunization given today  -     Zoster, SHINGRIX, (18 yrs +), IM    Dr. Anil Alvarez

## 2023-03-09 ENCOUNTER — OFFICE VISIT (OUTPATIENT)
Dept: INTERNAL MEDICINE CLINIC | Age: 58
End: 2023-03-09
Payer: COMMERCIAL

## 2023-03-09 VITALS
TEMPERATURE: 97.4 F | DIASTOLIC BLOOD PRESSURE: 82 MMHG | HEIGHT: 73 IN | HEART RATE: 76 BPM | BODY MASS INDEX: 29.5 KG/M2 | OXYGEN SATURATION: 97 % | SYSTOLIC BLOOD PRESSURE: 136 MMHG | WEIGHT: 222.6 LBS

## 2023-03-09 DIAGNOSIS — Z00.00 ANNUAL PHYSICAL EXAM: Primary | ICD-10-CM

## 2023-03-09 DIAGNOSIS — E78.5 HYPERLIPIDEMIA, UNSPECIFIED HYPERLIPIDEMIA TYPE: ICD-10-CM

## 2023-03-09 DIAGNOSIS — Z23 NEED FOR SHINGLES VACCINE: ICD-10-CM

## 2023-03-09 DIAGNOSIS — C43.59 MALIGNANT MELANOMA OF TORSO EXCLUDING BREAST (HCC): ICD-10-CM

## 2023-03-09 DIAGNOSIS — R40.0 DAYTIME SLEEPINESS: ICD-10-CM

## 2023-03-09 DIAGNOSIS — M33.20 POLYMYOSITIS (HCC): ICD-10-CM

## 2023-03-09 DIAGNOSIS — N20.0 KIDNEY STONE ON LEFT SIDE: ICD-10-CM

## 2023-03-09 PROCEDURE — 90471 IMMUNIZATION ADMIN: CPT | Performed by: INTERNAL MEDICINE

## 2023-03-09 PROCEDURE — 99396 PREV VISIT EST AGE 40-64: CPT | Performed by: INTERNAL MEDICINE

## 2023-03-09 PROCEDURE — 90750 HZV VACC RECOMBINANT IM: CPT | Performed by: INTERNAL MEDICINE

## 2023-03-09 SDOH — ECONOMIC STABILITY: FOOD INSECURITY: WITHIN THE PAST 12 MONTHS, YOU WORRIED THAT YOUR FOOD WOULD RUN OUT BEFORE YOU GOT MONEY TO BUY MORE.: NEVER TRUE

## 2023-03-09 SDOH — ECONOMIC STABILITY: INCOME INSECURITY: HOW HARD IS IT FOR YOU TO PAY FOR THE VERY BASICS LIKE FOOD, HOUSING, MEDICAL CARE, AND HEATING?: NOT HARD AT ALL

## 2023-03-09 SDOH — ECONOMIC STABILITY: HOUSING INSECURITY
IN THE LAST 12 MONTHS, WAS THERE A TIME WHEN YOU DID NOT HAVE A STEADY PLACE TO SLEEP OR SLEPT IN A SHELTER (INCLUDING NOW)?: NO

## 2023-03-09 SDOH — ECONOMIC STABILITY: FOOD INSECURITY: WITHIN THE PAST 12 MONTHS, THE FOOD YOU BOUGHT JUST DIDN'T LAST AND YOU DIDN'T HAVE MONEY TO GET MORE.: NEVER TRUE

## 2023-03-09 ASSESSMENT — PATIENT HEALTH QUESTIONNAIRE - PHQ9
SUM OF ALL RESPONSES TO PHQ QUESTIONS 1-9: 0
1. LITTLE INTEREST OR PLEASURE IN DOING THINGS: 0
SUM OF ALL RESPONSES TO PHQ QUESTIONS 1-9: 0
SUM OF ALL RESPONSES TO PHQ9 QUESTIONS 1 & 2: 0
2. FEELING DOWN, DEPRESSED OR HOPELESS: 0

## 2023-03-15 DIAGNOSIS — E78.41 ELEVATED LIPOPROTEIN(A): ICD-10-CM

## 2023-03-15 RX ORDER — ATORVASTATIN CALCIUM 20 MG/1
TABLET, FILM COATED ORAL
Qty: 90 TABLET | Refills: 0 | Status: SHIPPED | OUTPATIENT
Start: 2023-03-15

## 2023-03-15 NOTE — TELEPHONE ENCOUNTER
Refill request for ATORVASTATIN medication.      Name of Pharmacy- [facility]      Last visit - 3/9/23     Pending visit - 9/21/23    Last refill -12/21/22      Medication Contract signed -   Last Oarrs ran-         Additional Comments

## 2023-05-09 ENCOUNTER — OFFICE VISIT (OUTPATIENT)
Dept: INTERNAL MEDICINE CLINIC | Age: 58
End: 2023-05-09
Payer: COMMERCIAL

## 2023-05-09 VITALS
SYSTOLIC BLOOD PRESSURE: 128 MMHG | DIASTOLIC BLOOD PRESSURE: 70 MMHG | OXYGEN SATURATION: 97 % | WEIGHT: 219 LBS | HEART RATE: 71 BPM | BODY MASS INDEX: 28.74 KG/M2

## 2023-05-09 DIAGNOSIS — R10.32 LLQ ABDOMINAL PAIN: Primary | ICD-10-CM

## 2023-05-09 DIAGNOSIS — Z82.49 FAMILY HISTORY OF EARLY CAD: ICD-10-CM

## 2023-05-09 DIAGNOSIS — E78.5 HYPERLIPIDEMIA, UNSPECIFIED HYPERLIPIDEMIA TYPE: ICD-10-CM

## 2023-05-09 PROBLEM — Z80.8 FAMILY HISTORY OF BASAL CELL CARCINOMA: Status: RESOLVED | Noted: 2020-02-13 | Resolved: 2023-05-09

## 2023-05-09 PROBLEM — Z80.8 FAMILY HISTORY OF THYROID CANCER: Status: RESOLVED | Noted: 2020-02-13 | Resolved: 2023-05-09

## 2023-05-09 PROCEDURE — 99214 OFFICE O/P EST MOD 30 MIN: CPT | Performed by: NURSE PRACTITIONER

## 2023-05-09 RX ORDER — AMOXICILLIN AND CLAVULANATE POTASSIUM 875; 125 MG/1; MG/1
1 TABLET, FILM COATED ORAL 2 TIMES DAILY
Qty: 14 TABLET | Refills: 0 | Status: SHIPPED | OUTPATIENT
Start: 2023-05-09 | End: 2023-05-16

## 2023-05-09 ASSESSMENT — ENCOUNTER SYMPTOMS
HEMATOCHEZIA: 0
SORE THROAT: 0
SHORTNESS OF BREATH: 0
DIARRHEA: 1
SINUS PAIN: 0
CONSTIPATION: 0
NAUSEA: 0
COUGH: 0
ABDOMINAL PAIN: 1
CHEST TIGHTNESS: 0
VOMITING: 0
FLATUS: 1

## 2023-05-09 ASSESSMENT — PATIENT HEALTH QUESTIONNAIRE - PHQ9
SUM OF ALL RESPONSES TO PHQ QUESTIONS 1-9: 0
2. FEELING DOWN, DEPRESSED OR HOPELESS: 0
SUM OF ALL RESPONSES TO PHQ QUESTIONS 1-9: 0
SUM OF ALL RESPONSES TO PHQ9 QUESTIONS 1 & 2: 0
SUM OF ALL RESPONSES TO PHQ QUESTIONS 1-9: 0
SUM OF ALL RESPONSES TO PHQ QUESTIONS 1-9: 0
1. LITTLE INTEREST OR PLEASURE IN DOING THINGS: 0

## 2023-05-09 NOTE — PROGRESS NOTES
Robbie [address]  [address] [facility] Medicine  [address]  Tel:[phone]    Julia Aguilera is a 65 y.o. male who presents today for his medical conditions/complaints as noted below. Julia Aguilera is c/o of Abdominal Pain (X1 month- pain dull comes and goes. ) and Diarrhea (Not his typical bowel movement. Color: Lacinda Nasim )      Chief Complaint   Patient presents with    Abdominal Pain     X1 month- pain dull comes and goes. Diarrhea     Not his typical bowel movement. Color: Lacinda Nasim        HPI:     Mr. Dacia Layne presents for his semi routine check up and to discuss relatively new onset abdominal pain which has been persistent for roughly a month. He states he is doing well overall on his current lipitor dose. Denies known side effects. States his lipids have been stable for quite some time. 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 Abdominal Pain  This is a new problem. The current episode started more than 1 month ago. The onset quality is gradual. The problem occurs constantly. The problem has been waxing and waning. The pain is located in the LLQ. The pain is at a severity of 2/10. The pain is mild. The quality of the pain is aching. The abdominal pain radiates to the suprapubic region. Associated symptoms include diarrhea and flatus. Pertinent negatives include no arthralgias, constipation, dysuria, fever, frequency, headaches, hematochezia, hematuria, melena, myalgias, nausea or vomiting. Nothing aggravates the pain. The pain is relieved by Nothing. Treatments tried: Dietary change (eliminated seed/nuts) The treatment provided no relief. Past Medical History:   Diagnosis Date    Acne     DR. Traore    Basal cell adenocarcinoma     Skin    Family history of basal cell carcinoma 2/13/2020    Family history of early CAD     Family history of thyroid cancer 2/13/2020    Hyperlipidemia     Malignant melanoma

## 2023-06-19 DIAGNOSIS — E78.41 ELEVATED LIPOPROTEIN(A): ICD-10-CM

## 2023-06-19 RX ORDER — ATORVASTATIN CALCIUM 20 MG/1
TABLET, FILM COATED ORAL
Qty: 90 TABLET | Refills: 3 | Status: SHIPPED | OUTPATIENT
Start: 2023-06-19

## 2023-06-19 NOTE — TELEPHONE ENCOUNTER
Refill request for atorvastatin (LIPITOR) 20 MG tablet medication. Name of Pharmacy- [facility]       Last visit - 5/19/23     Pending visit - None    Last refill - 3/15/23      Medication Contract signed - PDMP Monitoring:    Last PDMP Irvin Lopez as Reviewed:  Review User Review Instant Review Result          [unfilled]  Urine Drug Screenings (1 yr)    No resulted procedures found.        Medication Contract and Consent for Opioid Use Documents Filed        No documents found                   Last Jesús stone-         Additional Comments

## 2024-05-21 ENCOUNTER — OFFICE VISIT (OUTPATIENT)
Dept: INTERNAL MEDICINE CLINIC | Age: 59
End: 2024-05-21
Payer: COMMERCIAL

## 2024-05-21 VITALS
OXYGEN SATURATION: 98 % | BODY MASS INDEX: 31.62 KG/M2 | HEART RATE: 73 BPM | DIASTOLIC BLOOD PRESSURE: 84 MMHG | TEMPERATURE: 97.9 F | SYSTOLIC BLOOD PRESSURE: 132 MMHG | WEIGHT: 238.6 LBS | HEIGHT: 73 IN

## 2024-05-21 DIAGNOSIS — M33.20 POLYMYOSITIS (HCC): ICD-10-CM

## 2024-05-21 DIAGNOSIS — C43.59 MALIGNANT MELANOMA OF TORSO EXCLUDING BREAST (HCC): ICD-10-CM

## 2024-05-21 DIAGNOSIS — K58.9 IRRITABLE BOWEL SYNDROME, UNSPECIFIED TYPE: Primary | ICD-10-CM

## 2024-05-21 DIAGNOSIS — R19.5 CHANGE IN CONSISTENCY OF STOOL: ICD-10-CM

## 2024-05-21 PROCEDURE — 99214 OFFICE O/P EST MOD 30 MIN: CPT | Performed by: NURSE PRACTITIONER

## 2024-05-21 RX ORDER — OMEPRAZOLE 20 MG/1
20 CAPSULE, DELAYED RELEASE ORAL
Qty: 30 CAPSULE | Refills: 0 | Status: SHIPPED | OUTPATIENT
Start: 2024-05-21 | End: 2024-06-04

## 2024-05-21 RX ORDER — DICYCLOMINE HYDROCHLORIDE 10 MG/1
10 CAPSULE ORAL
Qty: 60 CAPSULE | Refills: 0 | Status: SHIPPED | OUTPATIENT
Start: 2024-05-21 | End: 2024-06-04

## 2024-05-21 SDOH — ECONOMIC STABILITY: INCOME INSECURITY: HOW HARD IS IT FOR YOU TO PAY FOR THE VERY BASICS LIKE FOOD, HOUSING, MEDICAL CARE, AND HEATING?: NOT VERY HARD

## 2024-05-21 SDOH — ECONOMIC STABILITY: FOOD INSECURITY: WITHIN THE PAST 12 MONTHS, THE FOOD YOU BOUGHT JUST DIDN'T LAST AND YOU DIDN'T HAVE MONEY TO GET MORE.: NEVER TRUE

## 2024-05-21 SDOH — ECONOMIC STABILITY: FOOD INSECURITY: WITHIN THE PAST 12 MONTHS, YOU WORRIED THAT YOUR FOOD WOULD RUN OUT BEFORE YOU GOT MONEY TO BUY MORE.: NEVER TRUE

## 2024-05-21 ASSESSMENT — ENCOUNTER SYMPTOMS
ABDOMINAL DISTENTION: 1
NAUSEA: 0
COUGH: 0
CONSTIPATION: 0
BLOOD IN STOOL: 0
SORE THROAT: 0
CHEST TIGHTNESS: 0
ABDOMINAL PAIN: 1
SINUS PAIN: 0
VOMITING: 0
DIARRHEA: 0
SHORTNESS OF BREATH: 0

## 2024-05-21 ASSESSMENT — PATIENT HEALTH QUESTIONNAIRE - PHQ9
1. LITTLE INTEREST OR PLEASURE IN DOING THINGS: NOT AT ALL
SUM OF ALL RESPONSES TO PHQ QUESTIONS 1-9: 0
SUM OF ALL RESPONSES TO PHQ9 QUESTIONS 1 & 2: 0
SUM OF ALL RESPONSES TO PHQ QUESTIONS 1-9: 0
SUM OF ALL RESPONSES TO PHQ QUESTIONS 1-9: 0
2. FEELING DOWN, DEPRESSED OR HOPELESS: NOT AT ALL
SUM OF ALL RESPONSES TO PHQ QUESTIONS 1-9: 0

## 2024-06-19 ENCOUNTER — OFFICE VISIT (OUTPATIENT)
Dept: INTERNAL MEDICINE CLINIC | Age: 59
End: 2024-06-19
Payer: COMMERCIAL

## 2024-06-19 ENCOUNTER — HOSPITAL ENCOUNTER (OUTPATIENT)
Age: 59
Discharge: HOME | End: 2024-06-19
Payer: COMMERCIAL

## 2024-06-19 VITALS
HEIGHT: 73 IN | OXYGEN SATURATION: 98 % | BODY MASS INDEX: 31.17 KG/M2 | DIASTOLIC BLOOD PRESSURE: 70 MMHG | WEIGHT: 235.2 LBS | HEART RATE: 74 BPM | TEMPERATURE: 97.3 F | SYSTOLIC BLOOD PRESSURE: 138 MMHG

## 2024-06-19 DIAGNOSIS — K58.9 IRRITABLE BOWEL SYNDROME, UNSPECIFIED TYPE: ICD-10-CM

## 2024-06-19 DIAGNOSIS — Z00.00 WELL ADULT EXAM: ICD-10-CM

## 2024-06-19 DIAGNOSIS — E78.41 ELEVATED LIPOPROTEIN(A): ICD-10-CM

## 2024-06-19 DIAGNOSIS — Z00.00 WELL ADULT EXAM: Primary | ICD-10-CM

## 2024-06-19 LAB
25(OH)D3 SERPL-MCNC: 28.8 NG/ML
ALBUMIN SERPL-MCNC: 4.8 G/DL (ref 3.4–5)
ALBUMIN/GLOB SERPL: 1.8 {RATIO} (ref 1.1–2.2)
ALP SERPL-CCNC: 80 U/L (ref 40–129)
ALT SERPL-CCNC: 33 U/L (ref 10–40)
ANION GAP SERPL CALCULATED.3IONS-SCNC: 12 MMOL/L (ref 3–16)
AST SERPL-CCNC: 27 U/L (ref 15–37)
BASOPHILS # BLD: 0 K/UL (ref 0–0.2)
BASOPHILS NFR BLD: 0.6 %
BILIRUB SERPL-MCNC: 0.7 MG/DL (ref 0–1)
BUN SERPL-MCNC: 18 MG/DL (ref 7–20)
CALCIUM SERPL-MCNC: 9.5 MG/DL (ref 8.3–10.6)
CHLORIDE SERPL-SCNC: 102 MMOL/L (ref 99–110)
CHOLEST SERPL-MCNC: 172 MG/DL (ref 0–199)
CO2 SERPL-SCNC: 26 MMOL/L (ref 21–32)
CREAT SERPL-MCNC: 0.7 MG/DL (ref 0.9–1.3)
DEPRECATED RDW RBC AUTO: 13.3 % (ref 12.4–15.4)
EOSINOPHIL # BLD: 0.2 K/UL (ref 0–0.6)
EOSINOPHIL NFR BLD: 2.9 %
GFR SERPLBLD CREATININE-BSD FMLA CKD-EPI: >90 ML/MIN/{1.73_M2}
GLUCOSE SERPL-MCNC: 104 MG/DL (ref 70–99)
HCT VFR BLD AUTO: 46 % (ref 40.5–52.5)
HDLC SERPL-MCNC: 41 MG/DL (ref 40–60)
HGB BLD-MCNC: 16 G/DL (ref 13.5–17.5)
LDLC SERPL CALC-MCNC: 106 MG/DL
LYMPHOCYTES # BLD: 1.6 K/UL (ref 1–5.1)
LYMPHOCYTES NFR BLD: 19.8 %
MCH RBC QN AUTO: 29.7 PG (ref 26–34)
MCHC RBC AUTO-ENTMCNC: 34.8 G/DL (ref 31–36)
MCV RBC AUTO: 85.3 FL (ref 80–100)
MONOCYTES # BLD: 0.9 K/UL (ref 0–1.3)
MONOCYTES NFR BLD: 10.8 %
NEUTROPHILS # BLD: 5.2 K/UL (ref 1.7–7.7)
NEUTROPHILS NFR BLD: 65.9 %
PLATELET # BLD AUTO: 293 K/UL (ref 135–450)
PMV BLD AUTO: 8 FL (ref 5–10.5)
POTASSIUM SERPL-SCNC: 4.7 MMOL/L (ref 3.5–5.1)
PROT SERPL-MCNC: 7.5 G/DL (ref 6.4–8.2)
PSA SERPL DL<=0.01 NG/ML-MCNC: 1.07 NG/ML (ref 0–4)
RBC # BLD AUTO: 5.39 M/UL (ref 4.2–5.9)
SODIUM SERPL-SCNC: 140 MMOL/L (ref 136–145)
TRIGL SERPL-MCNC: 127 MG/DL (ref 0–150)
VLDLC SERPL CALC-MCNC: 25 MG/DL
WBC # BLD AUTO: 7.9 K/UL (ref 4–11)

## 2024-06-19 PROCEDURE — 83036 HEMOGLOBIN GLYCOSYLATED A1C: CPT

## 2024-06-19 PROCEDURE — 80061 LIPID PANEL: CPT

## 2024-06-19 PROCEDURE — 82306 VITAMIN D 25 HYDROXY: CPT

## 2024-06-19 PROCEDURE — 84153 ASSAY OF PSA TOTAL: CPT

## 2024-06-19 PROCEDURE — 99214 OFFICE O/P EST MOD 30 MIN: CPT | Performed by: NURSE PRACTITIONER

## 2024-06-19 PROCEDURE — 80053 COMPREHEN METABOLIC PANEL: CPT

## 2024-06-19 PROCEDURE — 85025 COMPLETE CBC W/AUTO DIFF WBC: CPT

## 2024-06-19 PROCEDURE — 36415 COLL VENOUS BLD VENIPUNCTURE: CPT

## 2024-06-19 RX ORDER — AMITRIPTYLINE HYDROCHLORIDE 10 MG/1
10 TABLET, FILM COATED ORAL NIGHTLY
Qty: 30 TABLET | Refills: 0 | Status: SHIPPED | OUTPATIENT
Start: 2024-06-19

## 2024-06-19 RX ORDER — ATORVASTATIN CALCIUM 20 MG/1
TABLET, FILM COATED ORAL
Qty: 90 TABLET | Refills: 3 | Status: SHIPPED | OUTPATIENT
Start: 2024-06-19

## 2024-06-19 RX ORDER — DICYCLOMINE HYDROCHLORIDE 10 MG/1
10 CAPSULE ORAL
Qty: 60 CAPSULE | Refills: 0 | Status: SHIPPED | OUTPATIENT
Start: 2024-06-19 | End: 2024-07-19

## 2024-06-19 ASSESSMENT — ENCOUNTER SYMPTOMS
ABDOMINAL PAIN: 0
SINUS PAIN: 0
DIARRHEA: 0
NAUSEA: 0
CHEST TIGHTNESS: 0
SHORTNESS OF BREATH: 0
COUGH: 0
ABDOMINAL DISTENTION: 0
CONSTIPATION: 0
SORE THROAT: 0
VOMITING: 0

## 2024-06-19 NOTE — PROGRESS NOTES
[facility] PHYSICIAN PRACTICES  [facility] Internal [facility]  [address] , [place] [address]  Tel:[phone]    Long Oneal is a 58 y.o. male who presents today for his medical conditions/complaints as noted below.  Long Oneal is c/o of Follow-up (Follow up on stomach pain. Discuss Atorvastatin )      Chief Complaint   Patient presents with    Follow-up     Follow up on stomach pain. Discuss Atorvastatin      HPI:     Long Oneal is a 58 y.o. male who presents for evaluation of abdominal pain. Onset was several weeks ago. Symptoms have been gradually improving since he began dicyclomine. He reports approx 80% improvement in his symptoms overall. Pain is located in the epigastric region and LLQ without radiation.  Aggravating factors: eating and pressure.  Alleviating factors: bowel movements. He describes more consistent bowel movements overall when regularly dosing the dicyclomine. The patient denies belching, constipation, fever, hematochezia, melena, and vomiting.     Patient's medications, allergies, past medical, surgical, social and family histories were reviewed and updated as appropriate.           Past Medical History:   Diagnosis Date    Acne         Basal cell adenocarcinoma     Skin    Family history of basal cell carcinoma 2/13/2020    Family history of early CAD     Family history of thyroid cancer 2/13/2020    Hyperlipidemia     Malignant melanoma (HCC) 2009    Treatment surgery and chemotherapy Dr. Bonilla    Neoplasm of uncertain behavior of skin 7/22/2009    Overview:  right posterior flank 7/2009    Personal history of malignant melanoma of skin 2/7/2007    BCC right cheek and left cheek 1/2007 BCC right posterior lower neck and right arm, 4/15/2011 BCC, metatypical, left pre-auricular 10/10/2011 BCC, nodular, left forehead, 4/10/2012 BCC, right jaw line, 4/22/2103 BCC, right cheek, 10/28/2013 BCC, right angle of jaw and right shoulder apex, 5/13/2014 BCC,

## 2024-06-20 LAB
EST. AVERAGE GLUCOSE BLD GHB EST-MCNC: 114 MG/DL
HBA1C MFR BLD: 5.6 %

## 2024-07-15 RX ORDER — AMITRIPTYLINE HYDROCHLORIDE 10 MG/1
10 TABLET, FILM COATED ORAL NIGHTLY
Qty: 90 TABLET | Refills: 1 | Status: SHIPPED | OUTPATIENT
Start: 2024-07-15

## 2024-07-15 NOTE — TELEPHONE ENCOUNTER
Refill request for AMITRIPTYLINE HCL 10 MG TAB medication.     Name of Pharmacy- [facility]      Last visit - 6-     Pending visit - N/A    Last refill - 6-      Medication Contract signed -   Last Oarrs ran-         Additional Comments

## 2025-02-03 NOTE — PROGRESS NOTES
No protocol for requested medication.    Medication: ammonium lactate (LAC-HYDRIN) 12 % cream  Last office visit date: 1/27/25  Pharmacy: [id number] - [place], [place] - [address]    Order pended, routed to clinician for review.   
right post-auricular, 11/11/2015 BCC, right post-auricular , 11/29/2017 BCC, left post-auricula    Polymyositis (HCC) 1975    DX at 10 years old    Varicose veins         Past Surgical History:   Procedure Laterality Date    APPENDECTOMY      COLONOSCOPY N/A 12/3/2020    COLONOSCOPY POLYPECTOMY SNARE/COLD BIOPSY performed by Bronson Trivedi MD at [facility] ENDOSCOPY    EYE SURGERY      Laser    HERNIA REPAIR      MUSCLE BIOPSY  2009    Polymyositis    SKIN CANCER EXCISION  2012    Basal cell-left temporal area    SKIN CANCER EXCISION  2009    Nodular malignant melanoma: Surgery, chemotherapy Dr. Bonilla       Family History   Problem Relation Age of Onset    Cancer Mother         Breast, Thyroid, Skin, Uterine CA    Heart Disease Father 72        MI    Cancer Father         Skin    High Blood Pressure Father     Other Brother         Kidney Stones       Social History     Tobacco Use    Smoking status: Never    Smokeless tobacco: Never   Substance Use Topics    Alcohol use: Yes     Comment: Rare        Current Outpatient Medications   Medication Sig Dispense Refill    dicyclomine (BENTYL) 10 MG capsule Take 1 capsule by mouth 2 times daily (before meals) for 14 days 60 capsule 0    omeprazole (PRILOSEC) 20 MG delayed release capsule Take 1 capsule by mouth every morning (before breakfast) for 14 days 30 capsule 0    atorvastatin (LIPITOR) 20 MG tablet TAKE 1 TABLET BY MOUTH EVERY DAY.FOR HIGH CHOLESTEROL 90 tablet 3     No current facility-administered medications for this visit.       No Known Allergies    Subjective:      Review of Systems   Constitutional:  Negative for fever.   HENT:  Negative for sinus pain and sore throat.    Respiratory:  Negative for cough, chest tightness and shortness of breath.    Cardiovascular:  Negative for chest pain and palpitations.   Gastrointestinal:  Positive for abdominal distention and abdominal pain. Negative for blood in stool, constipation, diarrhea, nausea and

## 2025-05-04 ENCOUNTER — HOSPITAL ENCOUNTER (EMERGENCY)
Age: 60
Discharge: HOME | End: 2025-05-04
Payer: COMMERCIAL

## 2025-05-04 VITALS
DIASTOLIC BLOOD PRESSURE: 91 MMHG | SYSTOLIC BLOOD PRESSURE: 150 MMHG | HEART RATE: 88 BPM | TEMPERATURE: 98.4 F | RESPIRATION RATE: 16 BRPM | BODY MASS INDEX: 28.93 KG/M2 | WEIGHT: 220.4 LBS | OXYGEN SATURATION: 98 %

## 2025-05-04 DIAGNOSIS — R30.0 DYSURIA: Primary | ICD-10-CM

## 2025-05-04 LAB
AMORPH SED URNS QL MICRO: NORMAL /HPF
BILIRUB UR QL STRIP.AUTO: NEGATIVE
CLARITY UR: ABNORMAL
COLOR UR: YELLOW
EPI CELLS #/AREA URNS HPF: NORMAL /HPF (ref 0–5)
GLUCOSE UR STRIP.AUTO-MCNC: NEGATIVE MG/DL
HGB UR QL STRIP.AUTO: NEGATIVE
KETONES UR STRIP.AUTO-MCNC: NEGATIVE MG/DL
LEUKOCYTE ESTERASE UR QL STRIP.AUTO: ABNORMAL
NITRITE UR QL STRIP.AUTO: NEGATIVE
PH UR STRIP.AUTO: 8 [PH] (ref 5–8)
PROT UR STRIP.AUTO-MCNC: NEGATIVE MG/DL
RBC #/AREA URNS HPF: NORMAL /HPF (ref 0–4)
SP GR UR STRIP.AUTO: 1.02 (ref 1–1.03)
UA COMPLETE W REFLEX CULTURE PNL UR: ABNORMAL
UA DIPSTICK W REFLEX MICRO PNL UR: YES
URN SPEC COLLECT METH UR: ABNORMAL
UROBILINOGEN UR STRIP-ACNC: 0.2 E.U./DL
WBC #/AREA URNS HPF: NORMAL /HPF (ref 0–5)

## 2025-05-04 PROCEDURE — 81001 URINALYSIS AUTO W/SCOPE: CPT

## 2025-05-04 PROCEDURE — 99283 EMERGENCY DEPT VISIT LOW MDM: CPT

## 2025-05-04 PROCEDURE — 87077 CULTURE AEROBIC IDENTIFY: CPT

## 2025-05-04 PROCEDURE — 87186 SC STD MICRODIL/AGAR DIL: CPT

## 2025-05-04 PROCEDURE — 87086 URINE CULTURE/COLONY COUNT: CPT

## 2025-05-04 RX ORDER — CEFUROXIME AXETIL 500 MG/1
500 TABLET ORAL 2 TIMES DAILY
COMMUNITY
Start: 2025-04-22 | End: 2025-04-29

## 2025-05-04 ASSESSMENT — PAIN - FUNCTIONAL ASSESSMENT: PAIN_FUNCTIONAL_ASSESSMENT: NONE - DENIES PAIN

## 2025-05-04 NOTE — ED PROVIDER NOTES
[facility] EMERGENCY DEPARTMENT  EMERGENCY DEPARTMENT ENCOUNTER        Pt Name: Long Oneal  MRN: [id number]  Birthdate 1965  Date of evaluation: 5/4/2025  Provider: LIT Espino  PCP: Evan German APRN - CNP  Note Started: 4:18 PM EDT 5/4/25      BALJINDER. I have evaluated this patient.        CHIEF COMPLAINT       Chief Complaint   Patient presents with    Dysuria     Pt complains of burning with urination and a sore right side of the neck. Has a UTI and was put on antibiotics for the last 7 days but his symptoms came back yesterday.       HISTORY OF PRESENT ILLNESS: 1 or more Elements     History From: Patient      Long Oneal is a 59 y.o. male who presents To the emergency department via private vehicle complaining of burning with urination. he states he was recently treated for urinary tract infection.  He was on cefuroxime.  He already completed the antibiotic.  He states he has follow-up with his urologist in 2 weeks but due to the burning sensation he came into the emergency department.  He denies any fevers or chills.  He states he has had chronic brain fog throughout this entirety.  He also reports he has a little bit of pain on the right side of his neck and thought he might have a small lymph node.  He denies any throat pain or ear pain.  He does not have any abdominal pain, nausea or vomiting.    Nursing Notes were all reviewed and agreed with or any disagreements were addressed in the HPI.    REVIEW OF SYSTEMS :      Review of Systems    Positives and Pertinent negatives as per HPI.     SURGICAL HISTORY     Past Surgical History:   Procedure Laterality Date    APPENDECTOMY      COLONOSCOPY N/A 12/3/2020    COLONOSCOPY POLYPECTOMY SNARE/COLD BIOPSY performed by Bronson Trivedi MD at [facility] ENDOSCOPY    EYE SURGERY      Laser    HERNIA REPAIR      MUSCLE BIOPSY  2009    Polymyositis    SKIN CANCER EXCISION  2012    Basal cell-left temporal area    SKIN CANCER

## 2025-05-06 ENCOUNTER — RESULTS FOLLOW-UP (OUTPATIENT)
Dept: EMERGENCY DEPT | Age: 60
End: 2025-05-06

## 2025-05-06 LAB
BACTERIA UR CULT: ABNORMAL
ORGANISM: ABNORMAL

## 2025-05-06 NOTE — RESULT ENCOUNTER NOTE
Culture reviewed, culture is positive but resistant to antibiotics prescribed at discharge.  Antibiotic needs to be changed to Keflex 500 mg 3 times daily for 10 days #30.

## 2025-07-03 DIAGNOSIS — E78.41 ELEVATED LIPOPROTEIN(A): ICD-10-CM

## 2025-07-03 RX ORDER — ATORVASTATIN CALCIUM 20 MG/1
TABLET, FILM COATED ORAL
Qty: 90 TABLET | Refills: 3 | Status: SHIPPED | OUTPATIENT
Start: 2025-07-03

## 2025-07-03 NOTE — TELEPHONE ENCOUNTER
Refill request for ATORVASTATIN medication.     Name of Pharmacy- [facility]      Last visit - 6/19/24     Pending visit - NONE    Last refill -3/30/25      Medication Contract signed -   Last Oarrs ran-         Additional Comments

## (undated) DEVICE — KIT INF CTRL 2OZ LUB TBNG L12FT DBL END BRSH SYR OP4

## (undated) DEVICE — FORCEP BX STD CAP 240CM RAD JAW 4

## (undated) DEVICE — CANNULA,OXY,ADULT,SUPERSOFT,W/7'TUB,SC: Brand: MEDLINE INDUSTRIES, INC.

## (undated) DEVICE — Z DISCONTINUED USE 2276105 GOWN PROTCT UNIV CHST W28IN L49IN SL 24IN BLU SPUNBOND FLM

## (undated) DEVICE — ELECTRODE,ECG,STRESS,FOAM,3PK: Brand: MEDLINE